# Patient Record
Sex: FEMALE | Race: OTHER
[De-identification: names, ages, dates, MRNs, and addresses within clinical notes are randomized per-mention and may not be internally consistent; named-entity substitution may affect disease eponyms.]

---

## 2019-12-29 ENCOUNTER — FORM ENCOUNTER (OUTPATIENT)
Age: 69
End: 2019-12-29

## 2020-01-10 ENCOUNTER — OUTPATIENT (OUTPATIENT)
Dept: OUTPATIENT SERVICES | Facility: HOSPITAL | Age: 70
LOS: 1 days | Discharge: HOME | End: 2020-01-10
Payer: MEDICARE

## 2020-01-10 DIAGNOSIS — R07.9 CHEST PAIN, UNSPECIFIED: ICD-10-CM

## 2020-01-10 PROCEDURE — 78452 HT MUSCLE IMAGE SPECT MULT: CPT | Mod: 26

## 2020-01-15 ENCOUNTER — FORM ENCOUNTER (OUTPATIENT)
Age: 70
End: 2020-01-15

## 2020-03-18 ENCOUNTER — FORM ENCOUNTER (OUTPATIENT)
Age: 70
End: 2020-03-18

## 2020-04-19 ENCOUNTER — FORM ENCOUNTER (OUTPATIENT)
Age: 70
End: 2020-04-19

## 2021-10-11 ENCOUNTER — FORM ENCOUNTER (OUTPATIENT)
Age: 71
End: 2021-10-11

## 2022-06-02 ENCOUNTER — FORM ENCOUNTER (OUTPATIENT)
Age: 72
End: 2022-06-02

## 2022-06-03 VITALS
SYSTOLIC BLOOD PRESSURE: 144 MMHG | HEART RATE: 90 BPM | OXYGEN SATURATION: 98 % | WEIGHT: 100 LBS | RESPIRATION RATE: 15 BRPM | HEIGHT: 58 IN | DIASTOLIC BLOOD PRESSURE: 80 MMHG | BODY MASS INDEX: 20.99 KG/M2

## 2022-08-30 ENCOUNTER — NON-APPOINTMENT (OUTPATIENT)
Age: 72
End: 2022-08-30

## 2022-08-30 DIAGNOSIS — R94.31 ABNORMAL ELECTROCARDIOGRAM [ECG] [EKG]: ICD-10-CM

## 2022-08-30 DIAGNOSIS — Z78.9 OTHER SPECIFIED HEALTH STATUS: ICD-10-CM

## 2022-08-30 PROBLEM — Z00.00 ENCOUNTER FOR PREVENTIVE HEALTH EXAMINATION: Status: ACTIVE | Noted: 2022-08-30

## 2022-12-23 ENCOUNTER — APPOINTMENT (OUTPATIENT)
Dept: CARDIOLOGY | Facility: CLINIC | Age: 72
End: 2022-12-23
Payer: MEDICARE

## 2022-12-23 VITALS
SYSTOLIC BLOOD PRESSURE: 145 MMHG | HEIGHT: 58 IN | BODY MASS INDEX: 20.99 KG/M2 | DIASTOLIC BLOOD PRESSURE: 86 MMHG | OXYGEN SATURATION: 99 % | HEART RATE: 100 BPM | WEIGHT: 100 LBS

## 2022-12-23 PROCEDURE — 99213 OFFICE O/P EST LOW 20 MIN: CPT | Mod: 25

## 2022-12-23 PROCEDURE — 93000 ELECTROCARDIOGRAM COMPLETE: CPT

## 2022-12-23 PROCEDURE — 93306 TTE W/DOPPLER COMPLETE: CPT

## 2022-12-27 NOTE — HISTORY OF PRESENT ILLNESS
[FreeTextEntry1] : FÁTIMA WALTER is a 72-year-old female, with a PMHx significant for nonischemic cardiomyopathy (LVEF of 35%, NYHA Class I), who presents today for a follow up visit. Endorses SOB with exertion, which is relieved with rest. Stable in nature. Otherwise: (-) fever, (-) chills, (-) chest pain, (-) cough, (-) hemoptysis, (-) recent URI, (-) abdominal pain, (-) nausea, (-) vomiting, (-) melena, (-) hematochezia, (-) leg swelling/pain, (-) recent travel, (-) prolonged immobility.

## 2022-12-27 NOTE — DISCUSSION/SUMMARY
[EKG obtained to assist in diagnosis and management of assessed problem(s)] : EKG obtained to assist in diagnosis and management of assessed problem(s) [FreeTextEntry1] : EKG performed today was unremarkable.\par \par Nonischemic Cardiomyopathy: NYHA Class I. Recommend an echocardiogram to reevaluate LV function and EF. Echocardiogram performed today revealed reduced EF of 35%. \par Increase Lisinopril to 20 mg QD for elevated BP. \par \par Instructed to follow up in 6 months for routine care. \par Plan was discussed with the patient.\par

## 2023-02-10 ENCOUNTER — INPATIENT (INPATIENT)
Facility: HOSPITAL | Age: 73
LOS: 3 days | Discharge: HOME | DRG: 872 | End: 2023-02-14
Attending: STUDENT IN AN ORGANIZED HEALTH CARE EDUCATION/TRAINING PROGRAM | Admitting: STUDENT IN AN ORGANIZED HEALTH CARE EDUCATION/TRAINING PROGRAM
Payer: MEDICARE

## 2023-02-10 VITALS
RESPIRATION RATE: 20 BRPM | TEMPERATURE: 100 F | SYSTOLIC BLOOD PRESSURE: 139 MMHG | OXYGEN SATURATION: 97 % | WEIGHT: 102.07 LBS | HEART RATE: 127 BPM | DIASTOLIC BLOOD PRESSURE: 77 MMHG

## 2023-02-10 LAB
APTT BLD: 32.9 SEC — SIGNIFICANT CHANGE UP (ref 27–39.2)
BASOPHILS # BLD AUTO: 0.08 K/UL — SIGNIFICANT CHANGE UP (ref 0–0.2)
BASOPHILS NFR BLD AUTO: 0.5 % — SIGNIFICANT CHANGE UP (ref 0–1)
EOSINOPHIL # BLD AUTO: 0.07 K/UL — SIGNIFICANT CHANGE UP (ref 0–0.7)
EOSINOPHIL NFR BLD AUTO: 0.5 % — SIGNIFICANT CHANGE UP (ref 0–8)
HCT VFR BLD CALC: 40.7 % — SIGNIFICANT CHANGE UP (ref 37–47)
HGB BLD-MCNC: 13.5 G/DL — SIGNIFICANT CHANGE UP (ref 12–16)
IMM GRANULOCYTES NFR BLD AUTO: 0.6 % — HIGH (ref 0.1–0.3)
INR BLD: 1.3 RATIO — SIGNIFICANT CHANGE UP (ref 0.65–1.3)
LYMPHOCYTES # BLD AUTO: 1.4 K/UL — SIGNIFICANT CHANGE UP (ref 1.2–3.4)
LYMPHOCYTES # BLD AUTO: 9.2 % — LOW (ref 20.5–51.1)
MCHC RBC-ENTMCNC: 25.7 PG — LOW (ref 27–31)
MCHC RBC-ENTMCNC: 33.2 G/DL — SIGNIFICANT CHANGE UP (ref 32–37)
MCV RBC AUTO: 77.5 FL — LOW (ref 81–99)
MONOCYTES # BLD AUTO: 0.98 K/UL — HIGH (ref 0.1–0.6)
MONOCYTES NFR BLD AUTO: 6.5 % — SIGNIFICANT CHANGE UP (ref 1.7–9.3)
NEUTROPHILS # BLD AUTO: 12.55 K/UL — HIGH (ref 1.4–6.5)
NEUTROPHILS NFR BLD AUTO: 82.7 % — HIGH (ref 42.2–75.2)
NRBC # BLD: 0 /100 WBCS — SIGNIFICANT CHANGE UP (ref 0–0)
PLATELET # BLD AUTO: 246 K/UL — SIGNIFICANT CHANGE UP (ref 130–400)
PROTHROM AB SERPL-ACNC: 14.9 SEC — HIGH (ref 9.95–12.87)
RBC # BLD: 5.25 M/UL — SIGNIFICANT CHANGE UP (ref 4.2–5.4)
RBC # FLD: 13.9 % — SIGNIFICANT CHANGE UP (ref 11.5–14.5)
WBC # BLD: 15.17 K/UL — HIGH (ref 4.8–10.8)
WBC # FLD AUTO: 15.17 K/UL — HIGH (ref 4.8–10.8)

## 2023-02-10 PROCEDURE — 93010 ELECTROCARDIOGRAM REPORT: CPT

## 2023-02-10 RX ORDER — SODIUM CHLORIDE 9 MG/ML
1000 INJECTION INTRAMUSCULAR; INTRAVENOUS; SUBCUTANEOUS ONCE
Refills: 0 | Status: DISCONTINUED | OUTPATIENT
Start: 2023-02-10 | End: 2023-02-10

## 2023-02-10 RX ORDER — ASPIRIN/CALCIUM CARB/MAGNESIUM 324 MG
324 TABLET ORAL ONCE
Refills: 0 | Status: COMPLETED | OUTPATIENT
Start: 2023-02-10 | End: 2023-02-10

## 2023-02-10 RX ORDER — VANCOMYCIN HCL 1 G
500 VIAL (EA) INTRAVENOUS ONCE
Refills: 0 | Status: COMPLETED | OUTPATIENT
Start: 2023-02-10 | End: 2023-02-10

## 2023-02-10 RX ORDER — SODIUM CHLORIDE 9 MG/ML
1450 INJECTION, SOLUTION INTRAVENOUS ONCE
Refills: 0 | Status: COMPLETED | OUTPATIENT
Start: 2023-02-10 | End: 2023-02-10

## 2023-02-10 RX ORDER — CEFEPIME 1 G/1
2000 INJECTION, POWDER, FOR SOLUTION INTRAMUSCULAR; INTRAVENOUS ONCE
Refills: 0 | Status: COMPLETED | OUTPATIENT
Start: 2023-02-10 | End: 2023-02-10

## 2023-02-10 RX ORDER — ACETAMINOPHEN 500 MG
975 TABLET ORAL ONCE
Refills: 0 | Status: COMPLETED | OUTPATIENT
Start: 2023-02-10 | End: 2023-02-10

## 2023-02-10 RX ADMIN — SODIUM CHLORIDE 1450 MILLILITER(S): 9 INJECTION, SOLUTION INTRAVENOUS at 23:40

## 2023-02-10 RX ADMIN — CEFEPIME 100 MILLIGRAM(S): 1 INJECTION, POWDER, FOR SOLUTION INTRAMUSCULAR; INTRAVENOUS at 23:50

## 2023-02-10 RX ADMIN — Medication 324 MILLIGRAM(S): at 23:54

## 2023-02-10 RX ADMIN — Medication 975 MILLIGRAM(S): at 23:54

## 2023-02-10 NOTE — ED PROVIDER NOTE - PHYSICAL EXAMINATION
CONSTITUTIONAL: well-developed, well-nourished, in no acute distress  SKIN: warm, dry  HEAD: Normocephalic  EYES: no conjunctival erythema  ENT: no nasal discharge, airway clear  NECK: full ROM  CARD: regular rate and rhythm  RESP: normal respiratory effort, no wheezes, rales or rhonchi orthopneic   ABD: soft, non-distended, non-tender  EXT: moving all extremities spontaneously  NEURO: alert and oriented, grossly unremarkable  PSYCH: cooperative, appropriate

## 2023-02-10 NOTE — ED PROVIDER NOTE - DIFFERENTIAL DIAGNOSIS
cardiac ischemia, cardiac arrhythmia, acute coronary syndromes, symptomatic anemia, electrolyte abnormalities including hyponatremia and hypokalemia, and pneumothorax. Differential Diagnosis

## 2023-02-10 NOTE — ED PROVIDER NOTE - CLINICAL SUMMARY MEDICAL DECISION MAKING FREE TEXT BOX
Patient was signed out to me by Dr. Myers pending admission for chest pressure and abnormal EKG. Patient remained hemodynamically stable during the course of ED stay. Discussed with patient about the results of the diagnostic studies. Discussed with admitting physician and MAR, patient is admitted to Medicine for further evaluation and care.

## 2023-02-10 NOTE — ED PROVIDER NOTE - OBJECTIVE STATEMENT
72-year-old female with past medical history of hypertension who present with few days of shortness of breath and chest pain.  Code STEMI was called but canceled later upon cardiology fellow evaluation.  Of note patient has been having cough for the past couple weeks and started having this shortness of breath chest pressure for the past few days.  She went to urgent care and was prescribed albuterol which she uses 1 puff.  Non-smoker.  Denies any cardiac history.  No nausea or vomiting abdominal pain urinary symptom or diarrhea.

## 2023-02-10 NOTE — ED PROVIDER NOTE - ATTENDING CONTRIBUTION TO CARE
72-year-old female to ED with chest pain and cough.  Patient with 2 weeks of symptoms no injury or fall patient here with family as today worsening pressure in her chest palpitations and not feeling well.  On arrival patient febrile  changes on EKG with depressions so STEMI code activated and cardiology at bedside.  AVSS, exam as noted, CTAB, tachycardia abdomen soft NTND, (+) bowel sounds, neuro nonfocal

## 2023-02-10 NOTE — ED PROVIDER NOTE - PROGRESS NOTE DETAILS
s/o to f/u xray and dispo rk: discussed case with cardio fellow Dr Fuchs, patient to go to [facility].

## 2023-02-10 NOTE — ED PROVIDER NOTE - CARE PLAN
Principal Discharge DX:	Acute sepsis  Secondary Diagnosis:	Community acquired pneumonia  Secondary Diagnosis:	Pulmonary congestion   1

## 2023-02-10 NOTE — ED PROVIDER NOTE - NS ED ROS FT
Constitutional: No fever   Eyes:  No visual changes  Ears:  No hearing changes  Neck: No neck pain  Cardiac:  +chest pain  Respiratory:  + SOB   GI:  No abdominal pain, nausea, or vomiting  :  No dysuria  MS:  No back pain  Neuro:  No headache or weakness.  No LOC  Skin:  No skin rash

## 2023-02-10 NOTE — CHART NOTE - NSCHARTNOTEFT_GEN_A_CORE
Code STEMI was called in ED. I immediately responded and went to the ED. I examined and assessed the patient. EKG reviewed and case discussed with interventional cardiologist on call. No ST elevations on EKG. Code STEMI cancelled. Code STEMI was called in ED. I immediately responded and went to the ED. I examined and assessed the patient. EKG reviewed and case discussed with interventional cardiologist on call- Dr. Coronado. No ST elevations on EKG. Code STEMI cancelled.

## 2023-02-11 DIAGNOSIS — A41.9 SEPSIS, UNSPECIFIED ORGANISM: ICD-10-CM

## 2023-02-11 LAB
ALBUMIN SERPL ELPH-MCNC: 3.4 G/DL — LOW (ref 3.5–5.2)
ALBUMIN SERPL ELPH-MCNC: 3.9 G/DL — SIGNIFICANT CHANGE UP (ref 3.5–5.2)
ALP SERPL-CCNC: 118 U/L — HIGH (ref 30–115)
ALP SERPL-CCNC: 98 U/L — SIGNIFICANT CHANGE UP (ref 30–115)
ALT FLD-CCNC: 11 U/L — SIGNIFICANT CHANGE UP (ref 0–41)
ALT FLD-CCNC: 15 U/L — SIGNIFICANT CHANGE UP (ref 0–41)
ANION GAP SERPL CALC-SCNC: 11 MMOL/L — SIGNIFICANT CHANGE UP (ref 7–14)
ANION GAP SERPL CALC-SCNC: 11 MMOL/L — SIGNIFICANT CHANGE UP (ref 7–14)
APPEARANCE UR: CLEAR — SIGNIFICANT CHANGE UP
AST SERPL-CCNC: 10 U/L — SIGNIFICANT CHANGE UP (ref 0–41)
AST SERPL-CCNC: 13 U/L — SIGNIFICANT CHANGE UP (ref 0–41)
BACTERIA # UR AUTO: NEGATIVE — SIGNIFICANT CHANGE UP
BASOPHILS # BLD AUTO: 0.05 K/UL — SIGNIFICANT CHANGE UP (ref 0–0.2)
BASOPHILS NFR BLD AUTO: 0.5 % — SIGNIFICANT CHANGE UP (ref 0–1)
BILIRUB SERPL-MCNC: 0.4 MG/DL — SIGNIFICANT CHANGE UP (ref 0.2–1.2)
BILIRUB SERPL-MCNC: 0.7 MG/DL — SIGNIFICANT CHANGE UP (ref 0.2–1.2)
BILIRUB UR-MCNC: NEGATIVE — SIGNIFICANT CHANGE UP
BUN SERPL-MCNC: 15 MG/DL — SIGNIFICANT CHANGE UP (ref 10–20)
BUN SERPL-MCNC: 20 MG/DL — SIGNIFICANT CHANGE UP (ref 10–20)
CALCIUM SERPL-MCNC: 8.5 MG/DL — SIGNIFICANT CHANGE UP (ref 8.4–10.5)
CALCIUM SERPL-MCNC: 8.9 MG/DL — SIGNIFICANT CHANGE UP (ref 8.4–10.5)
CHLORIDE SERPL-SCNC: 102 MMOL/L — SIGNIFICANT CHANGE UP (ref 98–110)
CHLORIDE SERPL-SCNC: 99 MMOL/L — SIGNIFICANT CHANGE UP (ref 98–110)
CO2 SERPL-SCNC: 26 MMOL/L — SIGNIFICANT CHANGE UP (ref 17–32)
CO2 SERPL-SCNC: 26 MMOL/L — SIGNIFICANT CHANGE UP (ref 17–32)
COLOR SPEC: COLORLESS — SIGNIFICANT CHANGE UP
CREAT SERPL-MCNC: 0.9 MG/DL — SIGNIFICANT CHANGE UP (ref 0.7–1.5)
CREAT SERPL-MCNC: 1 MG/DL — SIGNIFICANT CHANGE UP (ref 0.7–1.5)
CRP SERPL-MCNC: 158.2 MG/L — HIGH
D DIMER BLD IA.RAPID-MCNC: 194 NG/ML DDU — SIGNIFICANT CHANGE UP
DIFF PNL FLD: NEGATIVE — SIGNIFICANT CHANGE UP
EGFR: 61 ML/MIN/1.73M2 — SIGNIFICANT CHANGE UP
EGFR: 69 ML/MIN/1.73M2 — SIGNIFICANT CHANGE UP
EOSINOPHIL # BLD AUTO: 0.18 K/UL — SIGNIFICANT CHANGE UP (ref 0–0.7)
EOSINOPHIL NFR BLD AUTO: 1.7 % — SIGNIFICANT CHANGE UP (ref 0–8)
EPI CELLS # UR: 0 /HPF — SIGNIFICANT CHANGE UP (ref 0–5)
FERRITIN SERPL-MCNC: 87 NG/ML — SIGNIFICANT CHANGE UP (ref 15–150)
GLUCOSE SERPL-MCNC: 109 MG/DL — HIGH (ref 70–99)
GLUCOSE SERPL-MCNC: 121 MG/DL — HIGH (ref 70–99)
GLUCOSE UR QL: NEGATIVE — SIGNIFICANT CHANGE UP
HCT VFR BLD CALC: 37.7 % — SIGNIFICANT CHANGE UP (ref 37–47)
HGB BLD-MCNC: 12.2 G/DL — SIGNIFICANT CHANGE UP (ref 12–16)
HYALINE CASTS # UR AUTO: 0 /LPF — SIGNIFICANT CHANGE UP (ref 0–7)
IMM GRANULOCYTES NFR BLD AUTO: 0.5 % — HIGH (ref 0.1–0.3)
IRON SATN MFR SERPL: 10 % — LOW (ref 15–50)
IRON SATN MFR SERPL: 27 UG/DL — LOW (ref 35–150)
KETONES UR-MCNC: NEGATIVE — SIGNIFICANT CHANGE UP
LDH SERPL L TO P-CCNC: 169 — SIGNIFICANT CHANGE UP (ref 50–242)
LEUKOCYTE ESTERASE UR-ACNC: ABNORMAL
LYMPHOCYTES # BLD AUTO: 1.16 K/UL — LOW (ref 1.2–3.4)
LYMPHOCYTES # BLD AUTO: 11.1 % — LOW (ref 20.5–51.1)
MAGNESIUM SERPL-MCNC: 2.2 MG/DL — SIGNIFICANT CHANGE UP (ref 1.8–2.4)
MCHC RBC-ENTMCNC: 25.1 PG — LOW (ref 27–31)
MCHC RBC-ENTMCNC: 32.4 G/DL — SIGNIFICANT CHANGE UP (ref 32–37)
MCV RBC AUTO: 77.6 FL — LOW (ref 81–99)
MONOCYTES # BLD AUTO: 0.52 K/UL — SIGNIFICANT CHANGE UP (ref 0.1–0.6)
MONOCYTES NFR BLD AUTO: 5 % — SIGNIFICANT CHANGE UP (ref 1.7–9.3)
NEUTROPHILS # BLD AUTO: 8.53 K/UL — HIGH (ref 1.4–6.5)
NEUTROPHILS NFR BLD AUTO: 81.2 % — HIGH (ref 42.2–75.2)
NITRITE UR-MCNC: NEGATIVE — SIGNIFICANT CHANGE UP
NRBC # BLD: 0 /100 WBCS — SIGNIFICANT CHANGE UP (ref 0–0)
NT-PROBNP SERPL-SCNC: 7194 PG/ML — HIGH (ref 0–300)
PH UR: 7 — SIGNIFICANT CHANGE UP (ref 5–8)
PHOSPHATE SERPL-MCNC: 3.2 MG/DL — SIGNIFICANT CHANGE UP (ref 2.1–4.9)
PLATELET # BLD AUTO: 217 K/UL — SIGNIFICANT CHANGE UP (ref 130–400)
POTASSIUM SERPL-MCNC: 3.6 MMOL/L — SIGNIFICANT CHANGE UP (ref 3.5–5)
POTASSIUM SERPL-MCNC: 3.7 MMOL/L — SIGNIFICANT CHANGE UP (ref 3.5–5)
POTASSIUM SERPL-SCNC: 3.6 MMOL/L — SIGNIFICANT CHANGE UP (ref 3.5–5)
POTASSIUM SERPL-SCNC: 3.7 MMOL/L — SIGNIFICANT CHANGE UP (ref 3.5–5)
PROCALCITONIN SERPL-MCNC: 0.14 NG/ML — HIGH (ref 0.02–0.1)
PROT SERPL-MCNC: 5.4 G/DL — LOW (ref 6–8)
PROT SERPL-MCNC: 6.2 G/DL — SIGNIFICANT CHANGE UP (ref 6–8)
PROT UR-MCNC: NEGATIVE — SIGNIFICANT CHANGE UP
RAPID RVP RESULT: DETECTED
RBC # BLD: 4.86 M/UL — SIGNIFICANT CHANGE UP (ref 4.2–5.4)
RBC # FLD: 14.2 % — SIGNIFICANT CHANGE UP (ref 11.5–14.5)
RBC CASTS # UR COMP ASSIST: 0 /HPF — SIGNIFICANT CHANGE UP (ref 0–4)
RV+EV RNA SPEC QL NAA+PROBE: DETECTED
SARS-COV-2 RNA SPEC QL NAA+PROBE: SIGNIFICANT CHANGE UP
SODIUM SERPL-SCNC: 136 MMOL/L — SIGNIFICANT CHANGE UP (ref 135–146)
SODIUM SERPL-SCNC: 139 MMOL/L — SIGNIFICANT CHANGE UP (ref 135–146)
SP GR SPEC: 1 — LOW (ref 1.01–1.03)
TIBC SERPL-MCNC: 267 UG/DL — SIGNIFICANT CHANGE UP (ref 220–430)
TROPONIN T SERPL-MCNC: <0.01 NG/ML — SIGNIFICANT CHANGE UP
TROPONIN T SERPL-MCNC: <0.01 NG/ML — SIGNIFICANT CHANGE UP
UIBC SERPL-MCNC: 240 UG/DL — SIGNIFICANT CHANGE UP (ref 110–370)
UROBILINOGEN FLD QL: SIGNIFICANT CHANGE UP
WBC # BLD: 10.49 K/UL — SIGNIFICANT CHANGE UP (ref 4.8–10.8)
WBC # FLD AUTO: 10.49 K/UL — SIGNIFICANT CHANGE UP (ref 4.8–10.8)
WBC UR QL: 1 /HPF — SIGNIFICANT CHANGE UP (ref 0–5)

## 2023-02-11 PROCEDURE — 83615 LACTATE (LD) (LDH) ENZYME: CPT

## 2023-02-11 PROCEDURE — 82607 VITAMIN B-12: CPT

## 2023-02-11 PROCEDURE — 82746 ASSAY OF FOLIC ACID SERUM: CPT

## 2023-02-11 PROCEDURE — 84145 PROCALCITONIN (PCT): CPT

## 2023-02-11 PROCEDURE — 84100 ASSAY OF PHOSPHORUS: CPT

## 2023-02-11 PROCEDURE — 71045 X-RAY EXAM CHEST 1 VIEW: CPT | Mod: 26

## 2023-02-11 PROCEDURE — 71045 X-RAY EXAM CHEST 1 VIEW: CPT

## 2023-02-11 PROCEDURE — 83540 ASSAY OF IRON: CPT

## 2023-02-11 PROCEDURE — 83550 IRON BINDING TEST: CPT

## 2023-02-11 PROCEDURE — 85025 COMPLETE CBC W/AUTO DIFF WBC: CPT

## 2023-02-11 PROCEDURE — 83735 ASSAY OF MAGNESIUM: CPT

## 2023-02-11 PROCEDURE — 97161 PT EVAL LOW COMPLEX 20 MIN: CPT | Mod: GP

## 2023-02-11 PROCEDURE — 94640 AIRWAY INHALATION TREATMENT: CPT

## 2023-02-11 PROCEDURE — 87040 BLOOD CULTURE FOR BACTERIA: CPT

## 2023-02-11 PROCEDURE — 84443 ASSAY THYROID STIM HORMONE: CPT

## 2023-02-11 PROCEDURE — 86803 HEPATITIS C AB TEST: CPT

## 2023-02-11 PROCEDURE — 36415 COLL VENOUS BLD VENIPUNCTURE: CPT

## 2023-02-11 PROCEDURE — 84484 ASSAY OF TROPONIN QUANT: CPT

## 2023-02-11 PROCEDURE — 99222 1ST HOSP IP/OBS MODERATE 55: CPT

## 2023-02-11 PROCEDURE — 85027 COMPLETE CBC AUTOMATED: CPT

## 2023-02-11 PROCEDURE — 93306 TTE W/DOPPLER COMPLETE: CPT

## 2023-02-11 PROCEDURE — 82728 ASSAY OF FERRITIN: CPT

## 2023-02-11 PROCEDURE — 99223 1ST HOSP IP/OBS HIGH 75: CPT

## 2023-02-11 PROCEDURE — 85379 FIBRIN DEGRADATION QUANT: CPT

## 2023-02-11 PROCEDURE — 80053 COMPREHEN METABOLIC PANEL: CPT

## 2023-02-11 PROCEDURE — 86140 C-REACTIVE PROTEIN: CPT

## 2023-02-11 RX ORDER — LISINOPRIL 2.5 MG/1
10 TABLET ORAL DAILY
Refills: 0 | Status: DISCONTINUED | OUTPATIENT
Start: 2023-02-11 | End: 2023-02-14

## 2023-02-11 RX ORDER — CEFTRIAXONE 500 MG/1
INJECTION, POWDER, FOR SOLUTION INTRAMUSCULAR; INTRAVENOUS
Refills: 0 | Status: DISCONTINUED | OUTPATIENT
Start: 2023-02-11 | End: 2023-02-11

## 2023-02-11 RX ORDER — ENOXAPARIN SODIUM 100 MG/ML
40 INJECTION SUBCUTANEOUS EVERY 24 HOURS
Refills: 0 | Status: DISCONTINUED | OUTPATIENT
Start: 2023-02-11 | End: 2023-02-14

## 2023-02-11 RX ORDER — ACETAMINOPHEN 500 MG
650 TABLET ORAL EVERY 6 HOURS
Refills: 0 | Status: DISCONTINUED | OUTPATIENT
Start: 2023-02-11 | End: 2023-02-14

## 2023-02-11 RX ORDER — AZITHROMYCIN 500 MG/1
TABLET, FILM COATED ORAL
Refills: 0 | Status: DISCONTINUED | OUTPATIENT
Start: 2023-02-11 | End: 2023-02-11

## 2023-02-11 RX ORDER — CEFTRIAXONE 500 MG/1
1000 INJECTION, POWDER, FOR SOLUTION INTRAMUSCULAR; INTRAVENOUS ONCE
Refills: 0 | Status: COMPLETED | OUTPATIENT
Start: 2023-02-11 | End: 2023-02-11

## 2023-02-11 RX ORDER — IPRATROPIUM/ALBUTEROL SULFATE 18-103MCG
3 AEROSOL WITH ADAPTER (GRAM) INHALATION EVERY 6 HOURS
Refills: 0 | Status: DISCONTINUED | OUTPATIENT
Start: 2023-02-11 | End: 2023-02-14

## 2023-02-11 RX ORDER — SODIUM CHLORIDE 9 MG/ML
1000 INJECTION, SOLUTION INTRAVENOUS
Refills: 0 | Status: DISCONTINUED | OUTPATIENT
Start: 2023-02-11 | End: 2023-02-12

## 2023-02-11 RX ORDER — AZITHROMYCIN 500 MG/1
500 TABLET, FILM COATED ORAL ONCE
Refills: 0 | Status: COMPLETED | OUTPATIENT
Start: 2023-02-11 | End: 2023-02-11

## 2023-02-11 RX ORDER — ASPIRIN/CALCIUM CARB/MAGNESIUM 324 MG
81 TABLET ORAL DAILY
Refills: 0 | Status: DISCONTINUED | OUTPATIENT
Start: 2023-02-11 | End: 2023-02-14

## 2023-02-11 RX ORDER — ALBUTEROL 90 UG/1
2 AEROSOL, METERED ORAL EVERY 6 HOURS
Refills: 0 | Status: DISCONTINUED | OUTPATIENT
Start: 2023-02-11 | End: 2023-02-14

## 2023-02-11 RX ORDER — FUROSEMIDE 40 MG
40 TABLET ORAL ONCE
Refills: 0 | Status: DISCONTINUED | OUTPATIENT
Start: 2023-02-11 | End: 2023-02-11

## 2023-02-11 RX ORDER — GUAIFENESIN/DEXTROMETHORPHAN 600MG-30MG
10 TABLET, EXTENDED RELEASE 12 HR ORAL EVERY 4 HOURS
Refills: 0 | Status: DISCONTINUED | OUTPATIENT
Start: 2023-02-11 | End: 2023-02-14

## 2023-02-11 RX ADMIN — Medication 81 MILLIGRAM(S): at 11:45

## 2023-02-11 RX ADMIN — ENOXAPARIN SODIUM 40 MILLIGRAM(S): 100 INJECTION SUBCUTANEOUS at 14:34

## 2023-02-11 RX ADMIN — Medication 650 MILLIGRAM(S): at 11:01

## 2023-02-11 RX ADMIN — Medication 3 MILLILITER(S): at 14:34

## 2023-02-11 RX ADMIN — SODIUM CHLORIDE 75 MILLILITER(S): 9 INJECTION, SOLUTION INTRAVENOUS at 11:00

## 2023-02-11 RX ADMIN — Medication 3 MILLILITER(S): at 21:36

## 2023-02-11 RX ADMIN — Medication 975 MILLIGRAM(S): at 00:36

## 2023-02-11 RX ADMIN — SODIUM CHLORIDE 1450 MILLILITER(S): 9 INJECTION, SOLUTION INTRAVENOUS at 00:36

## 2023-02-11 RX ADMIN — AZITHROMYCIN 255 MILLIGRAM(S): 500 TABLET, FILM COATED ORAL at 04:00

## 2023-02-11 RX ADMIN — CEFTRIAXONE 100 MILLIGRAM(S): 500 INJECTION, POWDER, FOR SOLUTION INTRAMUSCULAR; INTRAVENOUS at 06:46

## 2023-02-11 RX ADMIN — CEFEPIME 2000 MILLIGRAM(S): 1 INJECTION, POWDER, FOR SOLUTION INTRAMUSCULAR; INTRAVENOUS at 00:25

## 2023-02-11 RX ADMIN — LISINOPRIL 10 MILLIGRAM(S): 2.5 TABLET ORAL at 06:46

## 2023-02-11 RX ADMIN — Medication 650 MILLIGRAM(S): at 10:23

## 2023-02-11 RX ADMIN — Medication 3 MILLILITER(S): at 08:00

## 2023-02-11 RX ADMIN — Medication 250 MILLIGRAM(S): at 00:36

## 2023-02-11 NOTE — H&P ADULT - NSHPPHYSICALEXAM_GEN_ALL_CORE
Vital Signs Last 24 Hrs  T(C): 37.7 (11 Feb 2023 00:25), Max: 38.9 (10 Feb 2023 23:28)  T(F): 99.8 (11 Feb 2023 00:25), Max: 102 (10 Feb 2023 23:28)  HR: 105 (11 Feb 2023 00:25) (105 - 127)  BP: 152/90 (11 Feb 2023 00:25) (139/77 - 172/83)  RR: 18 (11 Feb 2023 00:25) (18 - 20)  SpO2: 95% (11 Feb 2023 00:25) (95% - 97%)    Parameters below as of 11 Feb 2023 00:25  Patient On (Oxygen Delivery Method): room air    PHYSICAL EXAM  GENERAL: NAD  HEAD:  NCAT, EOMI, PERRL, MMM  NECK: Supple, Nontender  NERVOUS SYSTEM: AAOx3, NFD  CHEST/LUNG: + bs b/l w ronchi  HEART: +s1s2 RRR, tachycardia  ABDOMEN: soft, NT/ND  EXTREMITIES: pp, no edema

## 2023-02-11 NOTE — CONSULT NOTE ADULT - ASSESSMENT
SOB, chest tightness  HTN    -Code STEMI cancelled  -EKG showing no ST elevations  -trop (-) x1. Repeat and trend  -TTE  -keep K>4 and Mg>2  -serial EKG  -recall cardiology if recurrent chest pain  -RVP SOB, chest tightness  HTN    -admit to med tele  -Code STEMI cancelled  -EKG showing no ST elevations  -trop (-) x1. Repeat and trend  -TTE  -keep K>4 and Mg>2  -serial EKG  -recall cardiology if recurrent chest pain  -RVP

## 2023-02-11 NOTE — H&P ADULT - ASSESSMENT
72 F w h/o HTN presents for generalized symptoms of sob, chest pain and fatigue. In ED, there was some concern for ST changes on EKG so code STEMI was call but later cancelled after evaluated by cardiology. Later on patient spiked a fever to 102 and RVP came back positive for Enterovirus.    #Sepsis in setting of Enterovirus w possible superimposed bacterial pna  - patient febrile, tachycardic and tachypneic in ED w leukocytosis on labs  - s/p vanc, cefepime and azithro > continuing azithro and switching to rocephin  - cont nebs and inhaler, along w pain, fever, cough control prn  - f/u CXR read, pending labs and cultures  - can consider ID eval if no clinical improvement    #Elevated BNP in setting of viral infection - r/o viral pericarditis  - check echo & consider cardio eval depending on results  - cont tele monitoring in meantime    #HTN  - cont home lisinopril    DVT ppx: lovenox  GI ppx: ppi  Diet: DASH  Activity: IAT

## 2023-02-11 NOTE — ED PROCEDURE NOTE - PROCEDURE DATE TIME, MLM
Patient Education     Back Pain (Acute or Chronic)    Back pain is one of the most common problems. The good news is that most people feel better in 1 to 2 weeks, and most of the rest in 1 to 2 months. Most people can remain active.  People who have pain describe it differently--not everyone is the same.  · The pain can be sharp, stabbing, shooting, aching, cramping or burning.  · Movement, standing, bending, lifting, sitting, or walking may worsen pain.  · It can be localized to one spot or area, or it can be more generalized.  · It can spread or radiate upwards, to the front, or go down your arms or legs (sciatica).  · It can cause muscle spasm.  Most of the time, mechanical problems with the muscles or spine cause the pain. Mechanical problems are usually caused by an injury to the muscles or ligaments. While illness can cause back pain, it is usually not caused by a serious illness. Mechanical problems include:   · Physical activity such as sports, exercise, work, or normal activity  · Overexertion, lifting, pushing, pulling incorrectly or too aggressively  · Sudden twisting, bending, or stretching from an accident, or accidental movement  · Poor posture  · Stretching or moving wrong, without noticing pain at the time  · Poor coordination, lack of regular exercise (check with your doctor about this)  · Spinal disc disease or arthritis  · Stress  Pain can also be related to pregnancy, or illness like appendicitis, bladder or kidney infections, pelvic infections, and many other things.  Acute back pain usually gets better in 1 to 2 weeks. Back pain related to disk disease, arthritis in the spinal joints or spinal stenosis (narrowing of the spinal canal) can become chronic and last for months or years.  Unless you had a physical injury (for example, a car accident or fall) X-rays are usually not needed for the initial evaluation of back pain. If pain continues and does not respond to medical treatment, X-rays and  other tests may be needed.  Home care  Try these home care recommendations:  · When in bed, try to find a position of comfort. A firm mattress is best. Try lying flat on your back with pillows under your knees. You can also try lying on your side with your knees bent up towards your chest and a pillow between your knees.  · At first, do not try to stretch out the sore spots. If there is a strain, it is not like the good soreness you get after exercising without an injury. In this case, stretching may make it worse.  · Don't sit for long periods, as in a long car ride or during other travel. This puts more stress on the lower back than standing or walking.  · During the first 24 to 72 hours after an acute injury or flare up of chronic back pain, apply an ice pack to the painful area for 20 minutes and then remove it for 20 minutes. Do this over a period of 60 to 90 minutes or several times a day. This will reduce swelling and pain. Wrap the ice pack in a thin towel or plastic to protect your skin.  · You can start with ice, then switch to heat. Heat (hot shower, hot bath, or heating pad) reduces pain and works well for muscle spasms. Heat can be applied to the painful area for 20 minutes then remove it for 20 minutes. Do this over a period of 60 to 90 minutes or several times a day. Do not sleep on a heating pad. It can lead to skin burns or tissue damage.  · You can alternate ice and heat therapy. Talk with your doctor about the best treatment for your back pain.  · Therapeutic massage can help relax the back muscles without stretching them.  · Be aware of safe lifting methods and do not lift anything without stretching first.  Medicines  Talk to your doctor before using medicine, especially if you have other medical problems or are taking other medicines.  · You may use over-the-counter medicine as directed on the bottle to control pain, unless another pain medicine was prescribed. If you have chronic conditions  like diabetes, liver or kidney disease, stomach ulcers, or gastrointestinal bleeding, or are taking blood thinners, talk to your doctor before taking any medicine.  · Be careful if you are given a prescription medicines, narcotics, or medicine for muscle spasms. They can cause drowsiness, affect your coordination, reflexes, and judgement. Do not drive or operate heavy machinery.  Follow-up care  Follow up with your healthcare provider, or as advised.   A radiologist will review any X-rays that were taken. Your provide will notify you of any new findings that may affect your care.  Call 911  Call 911 if any of the following occur:  · Trouble breathing  · Confusion  · Very drowsy or trouble awakening  · Fainting or loss of consciousness  · Rapid or very slow heart rate  · Loss of bowel or bladder control  When to seek medical advice  Call your healthcare provider right away if any of these occur:   · Pain becomes worse or spreads to your legs  · Weakness or numbness in one or both legs  · Numbness in the groin or genital area  Date Last Reviewed: 7/1/2016  © 0896-5169 [employer]. [address] [place] [address], [place] [address]. All rights reserved. This information is not intended as a substitute for professional medical care. Always follow your healthcare professional's instructions.            11-Feb-2023 00:46

## 2023-02-11 NOTE — H&P ADULT - HISTORY OF PRESENT ILLNESS
72 F w h/o HTN presents for generalized symptoms of sob, chest pain and fatigue. In ED, there was some concern for ST changes on EKG so code STEMI was call but later cancelled after evaluated by cardiology. Patient with no complaints at time of my exam; ROS neg.     Initial Vitals showed, T 99.6, /77, , RR20, SpO2 97% on RA.   Labs significant for wbc 15.17 and BNP 7194  Later on it was reported that patient spiked a fever to 102 and RVP came back positive for Enterovirus.    Patient was admitted for sepsis workup.

## 2023-02-11 NOTE — CONSULT NOTE ADULT - ASSESSMENT
ASSESSMENT  72 F w h/o HTN presents for generalized symptoms of sob, chest pain and fatigue. In ED, there was some concern for ST changes on EKG so code STEMI was call but later cancelled after evaluated by cardiology. ID consulted for PNA       IMPRESSION  #  #+Entero/rhinovirus with Sepsis on admission T>101 P>90  < from: Xray Chest 1 View-PORTABLE IMMEDIATE (02.11.23 @ 00:17) >  No radiographic evidence of cardiopulmonary disease.    UA without significant pyuria   Troponin T, Serum: <0.01 ng/mL (02.11.23 @ 08:24) x2  Creatinine, Serum: 0.9 (02-11-23 @ 08:24)    Weight (kg): 46.3 (02-10-23 @ 23:12)    RECOMMENDATIONS  This is an incomplete consult note. All final recommendations to follow after interview and examination of the patient. Please follow recommendations noted below.  - Viral infection, no indication for antibiotics , D/C  - Supportive care   - f/u BCX    If any questions, please call or send a message on [employer] Teams  Please continue to update ID with any pertinent new laboratory or radiographic findings  #[id number]     ASSESSMENT  72 F w h/o HTN presents for generalized symptoms of sob, chest pain and fatigue. In ED, there was some concern for ST changes on EKG so code STEMI was call but later cancelled after evaluated by cardiology. ID consulted for PNA       IMPRESSION  #+Entero/rhinovirus with Sepsis on admission T>101 P>90  < from: Xray Chest 1 View-PORTABLE IMMEDIATE (02.11.23 @ 00:17) >  No radiographic evidence of cardiopulmonary disease.    UA without significant pyuria   Troponin T, Serum: <0.01 ng/mL (02.11.23 @ 08:24) x2  Creatinine, Serum: 0.9 (02-11-23 @ 08:24)    Weight (kg): 46.3 (02-10-23 @ 23:12)    RECOMMENDATIONS    - Viral infection, no indication for antibiotics , D/C  - Supportive care   - f/u BCX  - Recall ID PRN or if BCX results as +     If any questions, please call or send a message on [employer] Teams  Please continue to update ID with any pertinent new laboratory or radiographic findings  #[id number]

## 2023-02-11 NOTE — ED ADULT NURSE REASSESSMENT NOTE - NS ED NURSE REASSESS COMMENT FT1
given tylenol for c/o leg aching  no scale given
report called to [facility]
spoke with dr cheng regarding order for lasix and need for it  resident to confirm and advise
ate breakfast and lunch  awaiting bed  family at bedside  amb oob to br steady  denies complaints
pt awake and alert. denies pain/complaints  family at bedside  pending bed assignment  oob to br steady  continue to monitor

## 2023-02-11 NOTE — ED ADULT NURSE REASSESSMENT NOTE - CARDIO ASSESSMENT
--- There isn't one that is appropriate for a man with ranitidine off shelf now. He could look online for the OTC version temporarily. Otherwise until Pepcid is not on back order he could go to a PPI, whatever is covered at the lowest dose daily.

## 2023-02-11 NOTE — CONSULT NOTE ADULT - SUBJECTIVE AND OBJECTIVE BOX
FÁTIMA WALTER  72y, Female  Allergy: No Known Allergies      CHIEF COMPLAINT:   sepsis (2023 04:50)      LOS      HPI  HPI:  72 F w h/o HTN presents for generalized symptoms of sob, chest pain and fatigue. In ED, there was some concern for ST changes on EKG so code STEMI was call but later cancelled after evaluated by cardiology. Patient with no complaints at time of my exam; ROS neg.     Initial Vitals showed, T 99.6, /77, , RR20, SpO2 97% on RA.   Labs significant for wbc 15.17 and BNP 7194  Later on it was reported that patient spiked a fever to 102 and RVP came back positive for Enterovirus.    Patient was admitted for sepsis workup.  (2023 04:50)      INFECTIOUS DISEASE HISTORY:  ID consulted for PNA  Fever  +entero/rhinovirus     Currently ordered for:  azithromycin  IVPB      cefTRIAXone   IVPB          PMH  PAST MEDICAL & SURGICAL HISTORY:  Hypertension          FAMILY HISTORY  non-contributory     SOCIAL HISTORY  Social History:        ROS  ***    VITALS:  T(F): 97.8, Max: 102 (02-10-23 @ 23:28)  HR: 92  BP: 122/70  RR: 18Vital Signs Last 24 Hrs  T(C): 36.6 (2023 07:00), Max: 38.9 (10 Feb 2023 23:28)  T(F): 97.8 (2023 07:00), Max: 102 (10 Feb 2023 23:28)  HR: 92 (2023 07:00) (92 - 127)  BP: 122/70 (2023 07:00) (121/60 - 172/83)  BP(mean): --  RR: 18 (2023 07:00) (18 - 20)  SpO2: 98% (2023 07:00) (95% - 98%)    Parameters below as of 2023 08:00  Patient On (Oxygen Delivery Method): room air        PHYSICAL EXAM:  ***    TESTS & MEASUREMENTS:                        12.2   10.49 )-----------( 217      ( 2023 08:24 )             37.7         139  |  102  |  15  ----------------------------<  121<H>  3.6   |  26  |  0.9    Ca    8.5      2023 08:24  Phos  3.2     02-11  Mg     2.2     -    TPro  5.4<L>  /  Alb  3.4<L>  /  TBili  0.4  /  DBili  x   /  AST  10  /  ALT  11  /  AlkPhos  98  02-11      LIVER FUNCTIONS - ( 2023 08:24 )  Alb: 3.4 g/dL / Pro: 5.4 g/dL / ALK PHOS: 98 U/L / ALT: 11 U/L / AST: 10 U/L / GGT: x           Urinalysis Basic - ( 2023 00:43 )    Color: Colorless / Appearance: Clear / S.005 / pH: x  Gluc: x / Ketone: Negative  / Bili: Negative / Urobili: <2 mg/dL   Blood: x / Protein: Negative / Nitrite: Negative   Leuk Esterase: Small / RBC: 0 /HPF / WBC 1 /HPF   Sq Epi: x / Non Sq Epi: 0 /HPF / Bacteria: Negative              INFECTIOUS DISEASES TESTING  Rapid RVP Result: Detected (02-10-23 @ 23:41)      INFLAMMATORY MARKERS  C-Reactive Protein, Serum: 158.2 mg/L (23 @ 08:24)      RADIOLOGY & ADDITIONAL TESTS:  I have personally reviewed the last Chest xray  CXR      CT      CARDIOLOGY TESTING      MEDICATIONS  albuterol    90 MICROgram(s) HFA Inhaler 2 Inhalation every 6 hours  albuterol/ipratropium for Nebulization 3 Nebulizer every 6 hours  aspirin  chewable 81 Oral daily  azithromycin  IVPB     cefTRIAXone   IVPB     enoxaparin Injectable 40 SubCutaneous every 24 hours  lactated ringers. 1000 IV Continuous <Continuous>  lisinopril 10 Oral daily        ANTIBIOTICS:  azithromycin  IVPB      cefTRIAXone   IVPB          ALLERGIES:  No Known Allergies       FÁTIMA WALTER  72y, Female  Allergy: No Known Allergies      CHIEF COMPLAINT:   sepsis (2023 04:50)      LOS      HPI  HPI:  72 F w h/o HTN presents for generalized symptoms of sob, chest pain and fatigue. In ED, there was some concern for ST changes on EKG so code STEMI was call but later cancelled after evaluated by cardiology. Patient with no complaints at time of my exam; ROS neg.     Initial Vitals showed, T 99.6, /77, , RR20, SpO2 97% on RA.   Labs significant for wbc 15.17 and BNP 7194  Later on it was reported that patient spiked a fever to 102 and RVP came back positive for Enterovirus.    Patient was admitted for sepsis workup.  (2023 04:50)      INFECTIOUS DISEASE HISTORY:  ID consulted for PNA  Fever  +entero/rhinovirus     Currently ordered for:  azithromycin  IVPB      cefTRIAXone   IVPB          PMH  PAST MEDICAL & SURGICAL HISTORY:  Hypertension          FAMILY HISTORY  non-contributory     SOCIAL HISTORY  Social History:        ROS  General: Denies rigors, nightsweats  HEENT: as noted above  CV as noted above   PULM: Denies wheezing, hemoptysis  GI: Denies hematemesis, hematochezia, melena  : Denies discharge, hematuria  MSK: Denies arthralgias, myalgias  SKIN: Denies rash, lesions  NEURO: Denies paresthesias, weakness  PSYCH: Denies depression, anxiety     VITALS:  T(F): 97.8, Max: 102 (02-10-23 @ 23:28)  HR: 92  BP: 122/70  RR: 18Vital Signs Last 24 Hrs  T(C): 36.6 (2023 07:00), Max: 38.9 (10 Feb 2023 23:28)  T(F): 97.8 (2023 07:00), Max: 102 (10 Feb 2023 23:28)  HR: 92 (2023 07:00) (92 - 127)  BP: 122/70 (2023 07:00) (121/60 - 172/83)  BP(mean): --  RR: 18 (2023 07:00) (18 - 20)  SpO2: 98% (2023 07:00) (95% - 98%)    Parameters below as of 2023 08:00  Patient On (Oxygen Delivery Method): room air        PHYSICAL EXAM:  Gen: NAD, resting in bed  HEENT: Normocephalic, atraumatic  Neck: supple, no lymphadenopathy  CV: Regular rate & regular rhythm  Lungs: decreased BS at bases, no fremitus  Abdomen: Soft, BS present  Ext: Warm, well perfused  Neuro: non focal, awake  Skin: no rash, no erythema  Lines: no phlebitis     TESTS & MEASUREMENTS:                        12.2   10.49 )-----------( 217      ( 2023 08:24 )             37.7     -    139  |  102  |  15  ----------------------------<  121<H>  3.6   |  26  |  0.9    Ca    8.5      2023 08:24  Phos  3.2       Mg     2.2         TPro  5.4<L>  /  Alb  3.4<L>  /  TBili  0.4  /  DBili  x   /  AST  10  /  ALT  11  /  AlkPhos  98  02-11      LIVER FUNCTIONS - ( 2023 08:24 )  Alb: 3.4 g/dL / Pro: 5.4 g/dL / ALK PHOS: 98 U/L / ALT: 11 U/L / AST: 10 U/L / GGT: x           Urinalysis Basic - ( 2023 00:43 )    Color: Colorless / Appearance: Clear / S.005 / pH: x  Gluc: x / Ketone: Negative  / Bili: Negative / Urobili: <2 mg/dL   Blood: x / Protein: Negative / Nitrite: Negative   Leuk Esterase: Small / RBC: 0 /HPF / WBC 1 /HPF   Sq Epi: x / Non Sq Epi: 0 /HPF / Bacteria: Negative              INFECTIOUS DISEASES TESTING  Rapid RVP Result: Detected (02-10-23 @ 23:41)      INFLAMMATORY MARKERS  C-Reactive Protein, Serum: 158.2 mg/L (23 @ 08:24)      RADIOLOGY & ADDITIONAL TESTS:  I have personally reviewed the last Chest xray  CXR      CT      CARDIOLOGY TESTING      MEDICATIONS  albuterol    90 MICROgram(s) HFA Inhaler 2 Inhalation every 6 hours  albuterol/ipratropium for Nebulization 3 Nebulizer every 6 hours  aspirin  chewable 81 Oral daily  azithromycin  IVPB     cefTRIAXone   IVPB     enoxaparin Injectable 40 SubCutaneous every 24 hours  lactated ringers. 1000 IV Continuous <Continuous>  lisinopril 10 Oral daily        ANTIBIOTICS:  azithromycin  IVPB      cefTRIAXone   IVPB          ALLERGIES:  No Known Allergies

## 2023-02-11 NOTE — H&P ADULT - NSHPLABSRESULTS_GEN_ALL_CORE
Labs:                        13.5   15.17 )-----------( 246      ( 10 Feb 2023 23:41 )             40.7     136  |  99  |  20  ----------------------------<  109<H>  3.7   |  26  |  1.0    Ca    8.9      10 Feb 2023 23:41    TPro  6.2  /  Alb  3.9  /  TBili  0.7  /  DBili  x   /  AST  13  /  ALT  15  /  AlkPhos  118<H>  02-10    PT/INR - ( 10 Feb 2023 23:41 )   PT: 14.90 sec;   INR: 1.30 ratio    PTT - ( 10 Feb 2023 23:41 )  PTT:32.9 sec    Urinalysis Basic - ( 2023 00:43 )  Color: Colorless / Appearance: Clear / S.005 / pH: x  Gluc: x / Ketone: Negative  / Bili: Negative / Urobili: <2 mg/dL   Blood: x / Protein: Negative / Nitrite: Negative   Leuk Esterase: Small / RBC: 0 /HPF / WBC 1 /HPF   Sq Epi: x / Non Sq Epi: 0 /HPF / Bacteria: Negative     Troponin T, Serum: <0.01 ng/mL (02-10-23 @ 23:41)    Serum Pro-Brain Natriuretic Peptide: 7194 pg/mL (02-10-23 @ 23:41)

## 2023-02-11 NOTE — CONSULT NOTE ADULT - SUBJECTIVE AND OBJECTIVE BOX
Cardiologist: Cary    HPI:  72-year-old female to ED with chest pain and cough.  Patient with 2 weeks of symptoms no injury or fall patient here with family as today worsening pressure in her chest palpitations and not feeling well.  On arrival patient febrile  changes on EKG with depressions so STEMI code activated and cardiology at bedside.     PAST MEDICAL & SURGICAL HISTORY      FAMILY HISTORY:  FAMILY HISTORY:    [ ] no pertinent family history of premature cardiovascular disease in first degree relatives.  Mother:   Father:   Siblings:     SOCIAL HISTORY:  []smoker  []Alcohol  []Drug    ALLERGIES:  No Known Allergies      MEDICATIONS:  MEDICATIONS  (STANDING):  vancomycin  IVPB. 500 milliGRAM(s) IV Intermittent once    MEDICATIONS  (PRN):    HOME MEDICATIONS:  Home Medications:    VITALS:   T(F): 102 (02-10 @ 23:28), Max: 102 (02-10 @ 23:28)  HR: 117 (02-10 @ 23:28) (117 - 127)  BP: 172/83 (02-10 @ 23:28) (139/77 - 172/83)  BP(mean): --  RR: 20 (02-10 @ 23:28) (20 - 20)  SpO2: 97% (02-10 @ 23:28) (97% - 97%)    I&O's Summary      REVIEW OF SYSTEMS:    Negative except as mentioned in HPI    PHYSICAL EXAM:  NEURO: Awake , alert and oriented  GEN: Not in acute distress  NECK: No JVD  LUNGS: Clear to auscultation bilaterally   CARDIOVASCULAR: S1/S2 present, RRR , no murmurs or rubs, no carotid bruits,  + PP bilaterally  ABD: Soft, non-tender, non-distended, +BS  EXT: No JOSEPH  SKIN: Intact    LABS:                        13.5   15.17 )-----------( 246      ( 10 Feb 2023 23:41 )             40.7     02-10    136  |  99  |  20  ----------------------------<  109<H>  3.7   |  26  |  1.0    Ca    8.9      10 Feb 2023 23:41    TPro  6.2  /  Alb  3.9  /  TBili  0.7  /  DBili  x   /  AST  13  /  ALT  15  /  AlkPhos  118<H>  02-10    PT/INR - ( 10 Feb 2023 23:41 )   PT: 14.90 sec;   INR: 1.30 ratio         PTT - ( 10 Feb 2023 23:41 )  PTT:32.9 sec  Troponin T, Serum: <0.01 ng/mL (02-10-23 @ 23:41)    CARDIAC MARKERS ( 10 Feb 2023 23:41 )  x     / <0.01 ng/mL / x     / x     / x       Serum Pro-Brain Natriuretic Peptide: 7194 pg/mL (02-10-23 @ 23:41)    RADIOLOGY:  -CXR:    -TTE:    -CCTA:    -STRESS TEST:  < from: NM Nuclear Stress Pharmacologic Multiple (01.10.20 @ 11:54) >  Impression:  1. IV Adenosine Dual Isotope Study which was negative with respect to symptoms and EKG changes.  2. Myocardial perfusion imaging reveals no fixed no reperfusion defects  3. Gated imaging reveals septal motion consistent with an interventricular conduction defect with global hypokinesia with normal thickening with ejection fraction mildly reduced at 45%    < end of copied text >    -CATHETERIZATION:    ECG:    TELEMETRY EVENTS: sinus, LVH   Cardiologist: Cary    HPI:  72-year-old female to ED with chest pain and cough.  Patient with 2 weeks of symptoms no injury or fall patient here with family as today worsening pressure in her chest palpitations and not feeling well.  On arrival patient febrile  changes on EKG with depressions so STEMI code activated and cardiology at bedside.     PAST MEDICAL & SURGICAL HISTORY  as above    FAMILY HISTORY:  FAMILY HISTORY:    [ ] no pertinent family history of premature cardiovascular disease in first degree relatives.  Mother:   Father:   Siblings:     SOCIAL HISTORY:  []smoker  []Alcohol  []Drug    ALLERGIES:  No Known Allergies      MEDICATIONS:  MEDICATIONS  (STANDING):  vancomycin  IVPB. 500 milliGRAM(s) IV Intermittent once    MEDICATIONS  (PRN):    HOME MEDICATIONS:  Home Medications:    VITALS:   T(F): 102 (02-10 @ 23:28), Max: 102 (02-10 @ 23:28)  HR: 117 (02-10 @ 23:28) (117 - 127)  BP: 172/83 (02-10 @ 23:28) (139/77 - 172/83)  BP(mean): --  RR: 20 (02-10 @ 23:28) (20 - 20)  SpO2: 97% (02-10 @ 23:28) (97% - 97%)    I&O's Summary      REVIEW OF SYSTEMS:    Negative except as mentioned in HPI    PHYSICAL EXAM:  NEURO: Awake , alert and oriented  GEN: Not in acute distress  NECK: No JVD  LUNGS: Clear to auscultation bilaterally   CARDIOVASCULAR: S1/S2 present, RRR , no murmurs or rubs, no carotid bruits,  + PP bilaterally  ABD: Soft, non-tender, non-distended, +BS  EXT: No JOSEPH  SKIN: Intact    LABS:                        13.5   15.17 )-----------( 246      ( 10 Feb 2023 23:41 )             40.7     02-10    136  |  99  |  20  ----------------------------<  109<H>  3.7   |  26  |  1.0    Ca    8.9      10 Feb 2023 23:41    TPro  6.2  /  Alb  3.9  /  TBili  0.7  /  DBili  x   /  AST  13  /  ALT  15  /  AlkPhos  118<H>  02-10    PT/INR - ( 10 Feb 2023 23:41 )   PT: 14.90 sec;   INR: 1.30 ratio         PTT - ( 10 Feb 2023 23:41 )  PTT:32.9 sec  Troponin T, Serum: <0.01 ng/mL (02-10-23 @ 23:41)    CARDIAC MARKERS ( 10 Feb 2023 23:41 )  x     / <0.01 ng/mL / x     / x     / x       Serum Pro-Brain Natriuretic Peptide: 7194 pg/mL (02-10-23 @ 23:41)    RADIOLOGY:  -CXR:    -TTE:    -CCTA:    -STRESS TEST:  < from: NM Nuclear Stress Pharmacologic Multiple (01.10.20 @ 11:54) >  Impression:  1. IV Adenosine Dual Isotope Study which was negative with respect to symptoms and EKG changes.  2. Myocardial perfusion imaging reveals no fixed no reperfusion defects  3. Gated imaging reveals septal motion consistent with an interventricular conduction defect with global hypokinesia with normal thickening with ejection fraction mildly reduced at 45%    < end of copied text >    -CATHETERIZATION:    ECG:    TELEMETRY EVENTS: sinus, LVH

## 2023-02-11 NOTE — H&P ADULT - ATTENDING COMMENTS
HPI as above.  Interval history: Pt seen and examined at bedside. No cp or sob.  Pt states that she had cough and fever at home.   Vital Signs (24 Hrs):  T(C): 36.6 (02-11-23 @ 07:00), Max: 38.9 (02-10-23 @ 23:28)  HR: 92 (02-11-23 @ 07:00) (92 - 127)  BP: 122/70 (02-11-23 @ 07:00) (121/60 - 172/83)  RR: 18 (02-11-23 @ 07:00) (18 - 20)  SpO2: 98% (02-11-23 @ 07:00) (95% - 98%)  Wt(kg): --  Daily     Daily     I&O's Summary    PHYSICAL EXAM:  GENERAL: NAD, well-developed  HEAD:  Atraumatic, Normocephalic  EYES: EOMI, PERRLA, conjunctiva and sclera clear  NECK: Supple, No JVD  CHEST/LUNG: Clear to auscultation bilaterally; No wheeze  HEART: Regular rate and rhythm; No murmurs, rubs, or gallops  ABDOMEN: Soft, Nontender, Nondistended; Bowel sounds present  EXTREMITIES:  2+ Peripheral Pulses, No clubbing, cyanosis, or edema  PSYCH: AAOx3  NEUROLOGY: non-focal  SKIN: No rashes or lesions  Labs reviewed  Imaging reviewed independently and reviewed read  < from: Xray Chest 1 View-PORTABLE IMMEDIATE (02.11.23 @ 00:17) >    Impression:    No radiographic evidence of cardiopulmonary disease.    < end of copied text >      EKG reviewed independently and reviewed read  < from: 12 Lead ECG (02.10.23 @ 23:14) >    Diagnosis Line Sinus tachycardia  Left axis deviation  Left ventricular hypertrophy with QRS widening ( Sokolow-Rodriguez , Hayes  product , Romhilt-Scherer )  Nonspecific ST and T wave abnormality , consider lateral ischemia  Abnormal ECG    < end of copied text >      Plan  #sepsis POA 2/2 Enterovirus, no pneumonia on cxr- ID appreciated dw ID, recc DC antibiotics, supportive care  #Elevated BNP in setting of viral infection - r/o viral pericarditis  - check echo   -  dc cardiac telemonitoring   - trop neg x2     #rest as above     #Progress Note Handoff  Pending (specify):  follow up above  Family discussion: dw family at bedside  Disposition: home, PT   Decision to admit the pt is based on acuity as above  High risk given above acuity and comorbidities

## 2023-02-12 LAB
ALBUMIN SERPL ELPH-MCNC: 3.4 G/DL — LOW (ref 3.5–5.2)
ALP SERPL-CCNC: 94 U/L — SIGNIFICANT CHANGE UP (ref 30–115)
ALT FLD-CCNC: 11 U/L — SIGNIFICANT CHANGE UP (ref 0–41)
ANION GAP SERPL CALC-SCNC: 9 MMOL/L — SIGNIFICANT CHANGE UP (ref 7–14)
AST SERPL-CCNC: 11 U/L — SIGNIFICANT CHANGE UP (ref 0–41)
BILIRUB SERPL-MCNC: 0.3 MG/DL — SIGNIFICANT CHANGE UP (ref 0.2–1.2)
BUN SERPL-MCNC: 10 MG/DL — SIGNIFICANT CHANGE UP (ref 10–20)
CALCIUM SERPL-MCNC: 8.5 MG/DL — SIGNIFICANT CHANGE UP (ref 8.4–10.5)
CHLORIDE SERPL-SCNC: 105 MMOL/L — SIGNIFICANT CHANGE UP (ref 98–110)
CO2 SERPL-SCNC: 25 MMOL/L — SIGNIFICANT CHANGE UP (ref 17–32)
CREAT SERPL-MCNC: 0.8 MG/DL — SIGNIFICANT CHANGE UP (ref 0.7–1.5)
CULTURE RESULTS: NO GROWTH — SIGNIFICANT CHANGE UP
EGFR: 79 ML/MIN/1.73M2 — SIGNIFICANT CHANGE UP
FOLATE SERPL-MCNC: 9.6 NG/ML — SIGNIFICANT CHANGE UP
GLUCOSE SERPL-MCNC: 91 MG/DL — SIGNIFICANT CHANGE UP (ref 70–99)
HCT VFR BLD CALC: 36.4 % — LOW (ref 37–47)
HGB BLD-MCNC: 11.6 G/DL — LOW (ref 12–16)
MAGNESIUM SERPL-MCNC: 2 MG/DL — SIGNIFICANT CHANGE UP (ref 1.8–2.4)
MCHC RBC-ENTMCNC: 25.4 PG — LOW (ref 27–31)
MCHC RBC-ENTMCNC: 31.9 G/DL — LOW (ref 32–37)
MCV RBC AUTO: 79.8 FL — LOW (ref 81–99)
NRBC # BLD: 0 /100 WBCS — SIGNIFICANT CHANGE UP (ref 0–0)
PLATELET # BLD AUTO: 198 K/UL — SIGNIFICANT CHANGE UP (ref 130–400)
POTASSIUM SERPL-MCNC: 4 MMOL/L — SIGNIFICANT CHANGE UP (ref 3.5–5)
POTASSIUM SERPL-SCNC: 4 MMOL/L — SIGNIFICANT CHANGE UP (ref 3.5–5)
PROT SERPL-MCNC: 5.5 G/DL — LOW (ref 6–8)
RBC # BLD: 4.56 M/UL — SIGNIFICANT CHANGE UP (ref 4.2–5.4)
RBC # FLD: 14.3 % — SIGNIFICANT CHANGE UP (ref 11.5–14.5)
SODIUM SERPL-SCNC: 139 MMOL/L — SIGNIFICANT CHANGE UP (ref 135–146)
SPECIMEN SOURCE: SIGNIFICANT CHANGE UP
VIT B12 SERPL-MCNC: 255 PG/ML — SIGNIFICANT CHANGE UP (ref 232–1245)
WBC # BLD: 7.98 K/UL — SIGNIFICANT CHANGE UP (ref 4.8–10.8)
WBC # FLD AUTO: 7.98 K/UL — SIGNIFICANT CHANGE UP (ref 4.8–10.8)

## 2023-02-12 PROCEDURE — 99233 SBSQ HOSP IP/OBS HIGH 50: CPT

## 2023-02-12 RX ADMIN — Medication 3 MILLILITER(S): at 20:50

## 2023-02-12 RX ADMIN — Medication 81 MILLIGRAM(S): at 13:48

## 2023-02-12 RX ADMIN — Medication 3 MILLILITER(S): at 14:03

## 2023-02-12 RX ADMIN — Medication 3 MILLILITER(S): at 08:08

## 2023-02-12 RX ADMIN — ENOXAPARIN SODIUM 40 MILLIGRAM(S): 100 INJECTION SUBCUTANEOUS at 13:48

## 2023-02-12 RX ADMIN — LISINOPRIL 10 MILLIGRAM(S): 2.5 TABLET ORAL at 07:00

## 2023-02-12 NOTE — PATIENT PROFILE ADULT - CONTRAINDICATIONS & PRECAUTIONS (SELECT ALL THAT APPLY)
Patient/surrogate refused vaccine.../A severe allergic reaction (e.g. anaphylaxis) to any component of the applicable vaccine being administered

## 2023-02-12 NOTE — PROGRESS NOTE ADULT - SUBJECTIVE AND OBJECTIVE BOX
SABA FÁTIMA  [facility] 007 B ([facility])      Patient is a 72y old  Female who presents with a chief complaint of sepsis (11 Feb 2023 11:36)        Interval events:  Patient seen and examined at bedside. No acute events overnight. Says she does not have chest pain but feels like her heart rate is fast. Wants to go home.     -PMHx: Hypertension      -PSHx:No significant past surgical history            REVIEW OF SYSTEMS:  CONSTITUTIONAL: No fever, weight loss, or fatigue  RESPIRATORY: No cough, wheezing, chills or hemoptysis; No shortness of breath  CARDIOVASCULAR: as above   GASTROINTESTINAL: No abdominal or epigastric pain. No nausea, vomiting, or hematemesis; No diarrhea or constipation. No melena or hematochezia.  NEUROLOGICAL: No headaches  LYMPH NODES: No enlarged glands  MUSCULOSKELETAL: No joint pain or swelling; No muscle, back, or extremity pain      T(C): , Max: 36.5 (02-11-23 @ 15:28)  HR: 101 (02-12-23 @ 05:00)  BP: 104/64 (02-12-23 @ 05:00)  RR: 18 (02-12-23 @ 05:00)  SpO2: 98% (02-11-23 @ 15:28)  CAPILLARY BLOOD GLUCOSE      PHYSICAL EXAM  GENERAL: NAD  HEAD:  NCAT, EOMI, PERRL, MMM  NECK: Supple, Nontender  NERVOUS SYSTEM: AAOx3, NFD  CHEST/LUNG: + bs b/l w ronchi  HEART: +s1s2, tachycardia, regular rhythm   ABDOMEN: soft, NT/ND  EXTREMITIES: pp, no edema    Consultant(s) Notes Reviewed:  [x ] YES  [ ] NO  Care Discussed with Consultants/Other Providers [ x] YES  [ ] NO    LABS:          11.6  7.98  )-------(198          36.4  N=--  L=--  MCV=79.8    139|105|10  ------------------<91  4.0|25|0.8  eGFR:--  Ca:8.5      PT/INR - ( 10 Feb 2023 23:41 )   PT: 14.90 sec;   INR: 1.30 ratio         PTT - ( 10 Feb 2023 23:41 )  PTT:32.9 sec      Microbiology:    Culture - Urine (collected 02-11-23 @ 00:43)  Source: Clean Catch Clean Catch (Midstream)  Final Report (02-12-23 @ 11:54):    No growth    Culture - Blood (collected 02-10-23 @ 23:55)  Source: .Blood Blood-Peripheral  Preliminary Report (02-12-23 @ 11:01):    No growth to date.    Culture - Blood (collected 02-10-23 @ 23:55)  Source: .Blood Blood-Peripheral  Preliminary Report (02-12-23 @ 11:01):    No growth to date.        RADIOLOGY & ADDITIONAL TESTS:  < from: Xray Chest 1 View-PORTABLE IMMEDIATE (02.11.23 @ 00:17) >    Impression:    No radiographic evidence of cardiopulmonary disease.    < end of copied text >    < from: 12 Lead ECG (02.10.23 @ 23:14) >  Diagnosis Line Sinus tachycardia  Left axis deviation  Left ventricular hypertrophy with QRS widening ( Sokolow-Rodriguez , Guild  product , Romhilt-Scherer )  Nonspecific ST and T wave abnormality , consider lateral ischemia  Abnormal ECG    < end of copied text >        Medications:  acetaminophen     Tablet .. 650 milliGRAM(s) Oral every 6 hours PRN  albuterol    90 MICROgram(s) HFA Inhaler 2 Puff(s) Inhalation every 6 hours  albuterol/ipratropium for Nebulization 3 milliLiter(s) Nebulizer every 6 hours  aluminum hydroxide/magnesium hydroxide/simethicone Suspension 30 milliLiter(s) Oral every 4 hours PRN  aspirin  chewable 81 milliGRAM(s) Oral daily  enoxaparin Injectable 40 milliGRAM(s) SubCutaneous every 24 hours  guaifenesin/dextromethorphan Oral Liquid 10 milliLiter(s) Oral every 4 hours PRN  lisinopril 10 milliGRAM(s) Oral daily

## 2023-02-13 PROBLEM — I10 ESSENTIAL (PRIMARY) HYPERTENSION: Chronic | Status: ACTIVE | Noted: 2023-02-11

## 2023-02-13 LAB
HCV AB S/CO SERPL IA: 0.04 COI — SIGNIFICANT CHANGE UP
HCV AB SERPL-IMP: SIGNIFICANT CHANGE UP
TSH SERPL-MCNC: 1.62 UIU/ML — SIGNIFICANT CHANGE UP (ref 0.27–4.2)

## 2023-02-13 PROCEDURE — 99223 1ST HOSP IP/OBS HIGH 75: CPT

## 2023-02-13 PROCEDURE — 99232 SBSQ HOSP IP/OBS MODERATE 35: CPT

## 2023-02-13 PROCEDURE — 93306 TTE W/DOPPLER COMPLETE: CPT | Mod: 26

## 2023-02-13 PROCEDURE — 71045 X-RAY EXAM CHEST 1 VIEW: CPT | Mod: 26

## 2023-02-13 RX ORDER — ATORVASTATIN CALCIUM 80 MG/1
40 TABLET, FILM COATED ORAL AT BEDTIME
Refills: 0 | Status: DISCONTINUED | OUTPATIENT
Start: 2023-02-13 | End: 2023-02-14

## 2023-02-13 RX ORDER — METOPROLOL TARTRATE 50 MG
25 TABLET ORAL EVERY 8 HOURS
Refills: 0 | Status: DISCONTINUED | OUTPATIENT
Start: 2023-02-13 | End: 2023-02-14

## 2023-02-13 RX ADMIN — Medication 81 MILLIGRAM(S): at 12:28

## 2023-02-13 RX ADMIN — Medication 25 MILLIGRAM(S): at 21:22

## 2023-02-13 RX ADMIN — LISINOPRIL 10 MILLIGRAM(S): 2.5 TABLET ORAL at 06:08

## 2023-02-13 RX ADMIN — ATORVASTATIN CALCIUM 40 MILLIGRAM(S): 80 TABLET, FILM COATED ORAL at 21:22

## 2023-02-13 RX ADMIN — Medication 3 MILLILITER(S): at 13:36

## 2023-02-13 RX ADMIN — ENOXAPARIN SODIUM 40 MILLIGRAM(S): 100 INJECTION SUBCUTANEOUS at 13:47

## 2023-02-13 NOTE — CONSULT NOTE ADULT - ASSESSMENT
A 61 yo F with PMHx HTN admitted for sepsis found to be Entero/Rhinovirus found to have EF of 20% on echo    Recommendations:     # Suspected sepsis induced cardiomyopathy vs viral myocarditis vs CHF   - Clinically euvolemic. Poor historian, denies Entresto, Lasix, BB  - Echo showing EF 20%, mild-mod AR, mod MR  - Follow with Dr Fitzpatrick  - Need records of previous w/u     ** Attending to see  A 63 yo F with PMHx HTN admitted for sepsis found to be Entero/Rhinovirus found to have EF of 20% on echo    Recommendations:     # Suspected sepsis induced cardiomyopathy vs viral myocarditis vs CHF   - Clinically euvolemic. Poor historian, denies Entresto, Lasix, BB  - Echo showing EF 20%, mild-mod AR, mod MR  - Follow with Dr Esparza   - Need records of previous w/u     ** Attending to see  A 63 yo F with PMHx HTN admitted for sepsis found to be Entero/Rhinovirus found to have EF of 20% on echo    Recommendations:     # Suspected sepsis induced cardiomyopathy vs viral myocarditis vs CHF   - Clinically euvolemic. Poor historian, denies Entresto, Lasix, BB  - Echo showing EF 20%, mild-mod AR, mod MR  - BNP 4242  - Follow with Dr Esparza   - Need records of previous w/u     ** Attending to see  A 63 yo F with PMHx HTN admitted for sepsis found to be Entero/Rhinovirus found to have EF of 20% on echo    Recommendations:     # HFrEF possibly worsened by sepsis induced cardiomyopathy vs viral myocarditis  - Clinically euvolemic. Poor historian, denies Entresto, Lasix, JROGE  - BNP 7194  - Echo showing EF 20%, mild-mod AR, mod MR  - Follows with Dr Esparza: previous echo EF <35%  - C/w Lisinopril 10mg po QD  - Start Metoprolol tartrate 25mg po Q8H  - Can f/u with Dr Esparza as OP for further w/u and medication optimization      A 73 yo F with PMHx HTN admitted for sepsis found to be Entero/Rhinovirus found to have EF of 20% on echo    Recommendations:     # HFrEF possibly worsened by sepsis induced cardiomyopathy vs viral myocarditis  - Clinically euvolemic. Poor historian, denies Entresto, Lasix, JORGE  - BNP 7194  - Echo showing EF 20%, mild-mod AR, mod MR  - Follows with Dr Esparza: previous echo EF <35%  - C/w Lisinopril 10mg po QD  - Start Metoprolol tartrate 25mg po Q8H; switch to metoprolol succinate on discharge  - Can f/u with Dr Esparza as OP for further w/u and medication optimization

## 2023-02-13 NOTE — CONSULT NOTE ADULT - ATTENDING COMMENTS
non card cp  ecg reviewed - lvh with sec stt changes  no further card w/up   recall prn
Briefly, 72 year old woman for whom cardiology is consulted for reduced LVEF on echo. Echo reviewed and her LVEF is severely reduced ~20%. Her outpatient cardiologist documented that her LVEF is <35%. Unable to see the images of outpatient echo but this could mean her LVEF has remained stable. Regardless, she is asymptomatic from a cardiac perspective. She does not need an inpatient ischemia evaluation. Would optimize her medically with GDMT. Continue lisinopril, which can be changed to Entresto outpatient. Would start metoprolol tartrate and convert to succinate on discharge. She is euvolemic on exam so does not need any diuretics. Would start AA and SGLT2 inhibitor as outpatient if able. She can follow up with Dr. Esparza, her outpatient cardiologist, on discharge.     Thank you for this consult. Please call/MS teams with questions.

## 2023-02-13 NOTE — PROGRESS NOTE ADULT - SUBJECTIVE AND OBJECTIVE BOX
FÁTIMA WALTER 72y Female  MRN#: [id number]   CODE STATUS: FULL CODE     Admission Date: 02-11-23  Hospital Day: 2d    Pt is currently admitted with the primary diagnosis of SOB     SUBJECTIVE  Hospital Course  73 y/o woman w PMHx of HTN, ?HFrEF reports prior EF of 30s and follows w Dr Esparza, presents for CP and SOB, in ED initially was code STEMI but cancelled, trop neg <0.01 x2, admitted 2/11/23 for further workup of CP. (+)enterovirus and fever 102.     During admission had echo w EF <20%, no prior echo in records and pt denied h/o heart failure but when told her EF was <20%, stated that it was 30s before. Informed pt she has heart failure.     2/13/23 day 2: EF <20%, cardio consulted.     ADMITTING HISTORY  72 F w h/o HTN presents for generalized symptoms of sob, chest pain and fatigue. In ED, there was some concern for ST changes on EKG so code STEMI was call but later cancelled after evaluated by cardiology. Patient with no complaints at time of my exam; ROS neg.     Initial Vitals showed, T 99.6, /77, , RR20, SpO2 97% on RA.   Labs significant for wbc 15.17 and BNP 7194  Later on it was reported that patient spiked a fever to 102 and RVP came back positive for Enterovirus.    Patient was admitted for sepsis workup.       Overnight events  None     Subjective complaints  States feeling well.       T(C): 36.2 (02-13-23 @ 13:48)  T(F): 97.2 (02-13-23 @ 13:48)  HR: 99 (02-13-23 @ 13:48)  BP: 114/54 (02-13-23 @ 13:48)  RR: 18 (02-13-23 @ 13:48)  SpO2: --    PHYSICAL EXAM:  GENERAL: NAD, lying in bed comfortably  HEAD:  Atraumatic, Normocephalic  EYES: EOMI, sclera clear  ENT: Moist mucous membranes  NECK: Supple, trachea midline  CHEST/LUNG: Scant crackles b/l bases. Unlabored respirations, on RA. Walking w/o dyspnea.   HEART: Regular rate and rhythm; No appreciable murmurs, rubs, or gallops  ABDOMEN: (+)BS; Soft, nontender, nondistended  EXTREMITIES:  2+ Radial Pulses, brisk capillary refill. No edema.   NERVOUS SYSTEM:  A&Ox3, no noted facial droop. Moving all extremities w/o difficulty.   SKIN: No rashes or lesions to b/l forearm, face.         Present Today:   - Garcia:  No [ X ], Yes [  ] : Indication:   - Type of IV Access:       .. CVC/Piccline:  No [ X ], Yes [  ] : Indication:       .. Midline: No [ X ], Yes [  ] : Indication:                                              OBJECTIVE  PAST MEDICAL & SURGICAL HISTORY  Hypertension    No significant past surgical history                                                ALLERGIES:  No Known Allergies                           HOME MEDICATIONS  Home Medications:  lisinopril 10 mg oral tablet: 1 tab(s) orally once a day (11 Feb 2023 04:56)                           MEDICATIONS:  STANDING MEDICATIONS  albuterol    90 MICROgram(s) HFA Inhaler 2 Puff(s) Inhalation every 6 hours  albuterol/ipratropium for Nebulization 3 milliLiter(s) Nebulizer every 6 hours  aspirin  chewable 81 milliGRAM(s) Oral daily  atorvastatin 40 milliGRAM(s) Oral at bedtime  enoxaparin Injectable 40 milliGRAM(s) SubCutaneous every 24 hours  lisinopril 10 milliGRAM(s) Oral daily    PRN MEDICATIONS  acetaminophen     Tablet .. 650 milliGRAM(s) Oral every 6 hours PRN  aluminum hydroxide/magnesium hydroxide/simethicone Suspension 30 milliLiter(s) Oral every 4 hours PRN  guaifenesin/dextromethorphan Oral Liquid 10 milliLiter(s) Oral every 4 hours PRN                                            ------------------------------------------------------------  T(C): 36.2 (02-13-23 @ 13:48), Max: 36.2 (02-13-23 @ 06:08)  T(F): 97.2 (02-13-23 @ 13:48), Max: 97.2 (02-13-23 @ 06:08)  HR: 99 (02-13-23 @ 13:48) (98 - 107)  BP: 114/54 (02-13-23 @ 13:48) (114/54 - 142/65)  RR: 18 (02-13-23 @ 13:48) (17 - 18)  SpO2: --                                               LABS:                        11.6   7.98  )-----------( 198      ( 12 Feb 2023 07:38 )             36.4     02-12    139  |  105  |  10  ----------------------------<  91  4.0   |  25  |  0.8    Ca    8.5      12 Feb 2023 07:38  Mg     2.0     02-12    TPro  5.5<L>  /  Alb  3.4<L>  /  TBili  0.3  /  DBili  x   /  AST  11  /  ALT  11  /  AlkPhos  94  02-12                Culture - Blood (collected 11 Feb 2023 08:24)  Source: .Blood Blood  Preliminary Report (12 Feb 2023 19:02):    No growth to date.    Culture - Blood (collected 11 Feb 2023 08:24)  Source: .Blood Blood  Preliminary Report (12 Feb 2023 19:02):    No growth to date.    Culture - Urine (collected 11 Feb 2023 00:43)  Source: Clean Catch Clean Catch (Midstream)  Final Report (12 Feb 2023 11:54):    No growth    Culture - Blood (collected 10 Feb 2023 23:55)  Source: .Blood Blood-Peripheral  Preliminary Report (12 Feb 2023 11:01):    No growth to date.    Culture - Blood (collected 10 Feb 2023 23:55)  Source: .Blood Blood-Peripheral  Preliminary Report (12 Feb 2023 11:01):    No growth to date.                    RADIOLOGY:

## 2023-02-13 NOTE — CONSULT NOTE ADULT - SUBJECTIVE AND OBJECTIVE BOX
HPI:  72 F w h/o HTN presents for generalized symptoms of sob, chest pain and fatigue. In ED, there was some concern for ST changes on EKG so code STEMI was call but later cancelled after evaluated by cardiology. Patient with no complaints at time of my exam; ROS neg.     Initial Vitals showed, T 99.6, /77, , RR20, SpO2 97% on RA.   Labs significant for wbc 15.17 and BNP 7194  Later on it was reported that patient spiked a fever to 102 and RVP came back positive for Enterovirus.    Patient was admitted for sepsis workup.  (11 Feb 2023 04:50)      PAST MEDICAL & SURGICAL HISTORY:  Hypertension      No significant past surgical history          MEDICATIONS  (STANDING):  albuterol    90 MICROgram(s) HFA Inhaler 2 Puff(s) Inhalation every 6 hours  albuterol/ipratropium for Nebulization 3 milliLiter(s) Nebulizer every 6 hours  aspirin  chewable 81 milliGRAM(s) Oral daily  enoxaparin Injectable 40 milliGRAM(s) SubCutaneous every 24 hours  lisinopril 10 milliGRAM(s) Oral daily    MEDICATIONS  (PRN):  acetaminophen     Tablet .. 650 milliGRAM(s) Oral every 6 hours PRN Temp greater or equal to 38C (100.4F), Mild Pain (1 - 3), Moderate Pain (4 - 6)  aluminum hydroxide/magnesium hydroxide/simethicone Suspension 30 milliLiter(s) Oral every 4 hours PRN Dyspepsia  guaifenesin/dextromethorphan Oral Liquid 10 milliLiter(s) Oral every 4 hours PRN Cough      FAMILY HISTORY:  No pertinent family history in first degree relatives        Review of Systems:  CONSTITUTIONAL: No fever, weight loss, or fatigue  EYES: No eye pain, visual disturbances, or discharge  ENMT:  No difficulty hearing, tinnitus, vertigo; No sinus or throat pain  NECK: No pain or stiffness  BREASTS: No pain, masses, or nipple discharge  RESPIRATORY: No cough, wheezing, chills or hemoptysis; No shortness of breath  CARDIOVASCULAR: No chest pain, palpitations, dizziness, or leg swelling  GASTROINTESTINAL: No abdominal or epigastric pain. No nausea, vomiting, or hematemesis; No diarrhea or constipation. No melena or hematochezia.  GENITOURINARY: No dysuria, frequency, hematuria, or incontinence  NEUROLOGICAL: No headaches, memory loss, loss of strength, numbness, or tremors  SKIN: No itching, burning, rashes, or lesions   LYMPH NODES: No enlarged glands  ENDOCRINE: No heat or cold intolerance; No hair loss  MUSCULOSKELETAL: No joint pain or swelling; No muscle, back, or extremity pain  PSYCHIATRIC: No depression, anxiety, mood swings, or difficulty sleeping  HEME/LYMPH: No easy bruising, or bleeding gums  ALLERY AND IMMUNOLOGIC: No hives or eczema    SOCIAL HISTORY:    Vital Signs Last 24 Hrs  T(C): 36.2 (13 Feb 2023 06:08), Max: 36.7 (12 Feb 2023 13:39)  T(F): 97.2 (13 Feb 2023 06:08), Max: 98 (12 Feb 2023 13:39)  HR: 98 (13 Feb 2023 06:08) (88 - 107)  BP: 142/65 (13 Feb 2023 06:08) (110/66 - 142/65)  BP(mean): --  RR: 18 (13 Feb 2023 06:08) (17 - 18)  SpO2: --        Physical Exam:  GENERAL: NAD, well-groomed, well-developed  HEAD:  NCAT  EYES: EOMI, PERRLA, conjunctiva clear  ENMT: No tonsillar erythema, exudates, or enlargement; Moist mucous membranes, Good dentition, No lesions  NECK: Supple, No JVD, Normal thyroid  NERVOUS SYSTEM: AAOX4, Good concentration; Motor Strength 5/5 B/L upper and lower extremities; DTRs 2+ intact and symmetric  CHEST/LUNG: CTA b/l no w/r/r  HEART: +s1s2 RRR no m/g/r  ABDOMEN: soft, NT/ND (+) bs, no HSM  EXTREMITIES:  2+ Peripheral Pulses, No c/c/e  LYMPH: No lymphadenopathy noted  SKIN: No rashes or lesions    ECG:    ECHO:    I&O's Detail      LABS:                        11.6   7.98  )-----------( 198      ( 12 Feb 2023 07:38 )             36.4     02-12    139  |  105  |  10  ----------------------------<  91  4.0   |  25  |  0.8    Ca    8.5      12 Feb 2023 07:38  Mg     2.0     02-12    TPro  5.5<L>  /  Alb  3.4<L>  /  TBili  0.3  /  DBili  x   /  AST  11  /  ALT  11  /  AlkPhos  94  02-12            I&O's Summary    BNP    RADIOLOGY & ADDITIONAL STUDIES:   HPI:  72 F w h/o HTN presents for generalized symptoms of sob, chest pain and fatigue. In ED, there was some concern for ST changes on EKG so code STEMI was call but later cancelled after evaluated by cardiology. Patient with no complaints at time of my exam; ROS neg.     Initial Vitals showed, T 99.6, /77, , RR20, SpO2 97% on RA.   Labs significant for wbc 15.17 and BNP 7194  Later on it was reported that patient spiked a fever to 102 and RVP came back positive for Enterovirus.    Patient was admitted for sepsis workup.  (11 Feb 2023 04:50)      PAST MEDICAL & SURGICAL HISTORY:  Hypertension      No significant past surgical history          MEDICATIONS  (STANDING):  albuterol    90 MICROgram(s) HFA Inhaler 2 Puff(s) Inhalation every 6 hours  albuterol/ipratropium for Nebulization 3 milliLiter(s) Nebulizer every 6 hours  aspirin  chewable 81 milliGRAM(s) Oral daily  enoxaparin Injectable 40 milliGRAM(s) SubCutaneous every 24 hours  lisinopril 10 milliGRAM(s) Oral daily    MEDICATIONS  (PRN):  acetaminophen     Tablet .. 650 milliGRAM(s) Oral every 6 hours PRN Temp greater or equal to 38C (100.4F), Mild Pain (1 - 3), Moderate Pain (4 - 6)  aluminum hydroxide/magnesium hydroxide/simethicone Suspension 30 milliLiter(s) Oral every 4 hours PRN Dyspepsia  guaifenesin/dextromethorphan Oral Liquid 10 milliLiter(s) Oral every 4 hours PRN Cough      FAMILY HISTORY:  No pertinent family history in first degree relatives        Review of Systems:  CONSTITUTIONAL: No fever, weight loss, or fatigue  EYES: No eye pain, visual disturbances, or discharge  ENMT:  No difficulty hearing, tinnitus, vertigo; No sinus or throat pain  NECK: No pain or stiffness  BREASTS: No pain, masses, or nipple discharge  RESPIRATORY: No cough, wheezing, chills or hemoptysis; No shortness of breath  CARDIOVASCULAR: No chest pain, palpitations, dizziness, or leg swelling  GASTROINTESTINAL: No abdominal or epigastric pain. No nausea, vomiting, or hematemesis; No diarrhea or constipation. No melena or hematochezia.  GENITOURINARY: No dysuria, frequency, hematuria, or incontinence  NEUROLOGICAL: No headaches, memory loss, loss of strength, numbness, or tremors  SKIN: No itching, burning, rashes, or lesions   LYMPH NODES: No enlarged glands  ENDOCRINE: No heat or cold intolerance; No hair loss  MUSCULOSKELETAL: No joint pain or swelling; No muscle, back, or extremity pain  PSYCHIATRIC: No depression, anxiety, mood swings, or difficulty sleeping  HEME/LYMPH: No easy bruising, or bleeding gums  ALLERY AND IMMUNOLOGIC: No hives or eczema    SOCIAL HISTORY:    Vital Signs Last 24 Hrs  T(C): 36.2 (13 Feb 2023 06:08), Max: 36.7 (12 Feb 2023 13:39)  T(F): 97.2 (13 Feb 2023 06:08), Max: 98 (12 Feb 2023 13:39)  HR: 98 (13 Feb 2023 06:08) (88 - 107)  BP: 142/65 (13 Feb 2023 06:08) (110/66 - 142/65)  BP(mean): --  RR: 18 (13 Feb 2023 06:08) (17 - 18)  SpO2: --        Physical Exam:  GENERAL: NAD, well-groomed, well-developed  HEAD:  NCAT  EYES: EOMI, PERRLA, conjunctiva clear  ENMT: No tonsillar erythema, exudates, or enlargement; Moist mucous membranes, Good dentition, No lesions  NECK: Supple, No JVD, Normal thyroid  NERVOUS SYSTEM: AAOX4, Good concentration; Motor Strength 5/5 B/L upper and lower extremities; DTRs 2+ intact and symmetric  CHEST/LUNG: CTA b/l no w/r/r  HEART: +s1s2 RRR no m/g/r  ABDOMEN: soft, NT/ND (+) bs, no HSM  EXTREMITIES:  2+ Peripheral Pulses, No c/c/e  LYMPH: No lymphadenopathy noted  SKIN: No rashes or lesions    ECG:    ECHO:   1. Left ventricular ejection fraction, by visual estimation, is <20%.  2. Severely decreased global left ventricular systolic function.  3. The left ventricular diastolic function could not be assessed in this study.  4. Left atrial enlargement.  5. Mild-to-moderate aortic regurgitation.  6. Moderate mitral regurgitation.      I&O's Detail      LABS:                        11.6   7.98  )-----------( 198      ( 12 Feb 2023 07:38 )             36.4     02-12    139  |  105  |  10  ----------------------------<  91  4.0   |  25  |  0.8    Ca    8.5      12 Feb 2023 07:38  Mg     2.0     02-12    TPro  5.5<L>  /  Alb  3.4<L>  /  TBili  0.3  /  DBili  x   /  AST  11  /  ALT  11  /  AlkPhos  94  02-12        I&O's Summary    BNP    RADIOLOGY & ADDITIONAL STUDIES:   HPI:  72 F w h/o HTN presents for generalized symptoms of sob, chest pain and fatigue. In ED, there was some concern for ST changes on EKG so code STEMI was call but later cancelled after evaluated by cardiology. Patient with no complaints at time of my exam; ROS neg.     Initial Vitals showed, T 99.6, /77, , RR20, SpO2 97% on RA.   Labs significant for wbc 15.17 and BNP 7194  Later on it was reported that patient spiked a fever to 102 and RVP came back positive for Enterovirus.    Patient was admitted for sepsis workup.  (11 Feb 2023 04:50)      PAST MEDICAL & SURGICAL HISTORY:  Hypertension      No significant past surgical history          MEDICATIONS  (STANDING):  albuterol    90 MICROgram(s) HFA Inhaler 2 Puff(s) Inhalation every 6 hours  albuterol/ipratropium for Nebulization 3 milliLiter(s) Nebulizer every 6 hours  aspirin  chewable 81 milliGRAM(s) Oral daily  enoxaparin Injectable 40 milliGRAM(s) SubCutaneous every 24 hours  lisinopril 10 milliGRAM(s) Oral daily    MEDICATIONS  (PRN):  acetaminophen     Tablet .. 650 milliGRAM(s) Oral every 6 hours PRN Temp greater or equal to 38C (100.4F), Mild Pain (1 - 3), Moderate Pain (4 - 6)  aluminum hydroxide/magnesium hydroxide/simethicone Suspension 30 milliLiter(s) Oral every 4 hours PRN Dyspepsia  guaifenesin/dextromethorphan Oral Liquid 10 milliLiter(s) Oral every 4 hours PRN Cough      FAMILY HISTORY:  No pertinent family history in first degree relatives        Review of Systems:  CONSTITUTIONAL: No fever, weight loss, or fatigue  EYES: No eye pain, visual disturbances, or discharge  ENMT:  No difficulty hearing, tinnitus, vertigo; No sinus or throat pain  NECK: No pain or stiffness  BREASTS: No pain, masses, or nipple discharge  RESPIRATORY: No cough, wheezing, chills or hemoptysis; No shortness of breath  CARDIOVASCULAR: No chest pain, palpitations, dizziness, or leg swelling  GASTROINTESTINAL: No abdominal or epigastric pain. No nausea, vomiting, or hematemesis; No diarrhea or constipation. No melena or hematochezia.  GENITOURINARY: No dysuria, frequency, hematuria, or incontinence  NEUROLOGICAL: No headaches, memory loss, loss of strength, numbness, or tremors  SKIN: No itching, burning, rashes, or lesions   LYMPH NODES: No enlarged glands  ENDOCRINE: No heat or cold intolerance; No hair loss  MUSCULOSKELETAL: No joint pain or swelling; No muscle, back, or extremity pain  PSYCHIATRIC: No depression, anxiety, mood swings, or difficulty sleeping  HEME/LYMPH: No easy bruising, or bleeding gums  ALLERY AND IMMUNOLOGIC: No hives or eczema    SOCIAL HISTORY:    Vital Signs Last 24 Hrs  T(C): 36.2 (13 Feb 2023 06:08), Max: 36.7 (12 Feb 2023 13:39)  T(F): 97.2 (13 Feb 2023 06:08), Max: 98 (12 Feb 2023 13:39)  HR: 98 (13 Feb 2023 06:08) (88 - 107)  BP: 142/65 (13 Feb 2023 06:08) (110/66 - 142/65)  RR: 18 (13 Feb 2023 06:08) (17 - 18)          Physical Exam:  GENERAL: NAD, well-groomed, well-developed  HEAD:  NCAT  EYES: EOMI, PERRLA, conjunctiva clear  ENMT: No tonsillar erythema, exudates, or enlargement; Moist mucous membranes, Good dentition, No lesions  NECK: Supple, No JVD, Normal thyroid  NERVOUS SYSTEM: AAOX4, Good concentration; Motor Strength 5/5 B/L upper and lower extremities; DTRs 2+ intact and symmetric  CHEST/LUNG: CTA b/l no w/r/r  HEART: +s1s2 RRR no m/g/r  ABDOMEN: soft, NT/ND (+) bs, no HSM  EXTREMITIES:  2+ Peripheral Pulses, No c/c/e  LYMPH: No lymphadenopathy noted  SKIN: No rashes or lesions    ECG:    ECHO: 2/13/23  1. Left ventricular ejection fraction, by visual estimation, is <20%.  2. Severely decreased global left ventricular systolic function.  3. The left ventricular diastolic function could not be assessed in this study.  4. Left atrial enlargement.  5. Mild-to-moderate aortic regurgitation.  6. Moderate mitral regurgitation.      I&O's Detail      LABS:                        11.6   7.98  )-----------( 198      ( 12 Feb 2023 07:38 )             36.4     02-12    139  |  105  |  10  ----------------------------<  91  4.0   |  25  |  0.8    Ca    8.5      12 Feb 2023 07:38  Mg     2.0     02-12    TPro  5.5<L>  /  Alb  3.4<L>  /  TBili  0.3  /  DBili  x   /  AST  11  /  ALT  11  /  AlkPhos  94  02-12        I&O's Summary    BNP    RADIOLOGY & ADDITIONAL STUDIES:   HPI:  72 F w h/o HTN presents for generalized symptoms of sob, chest pain and fatigue. In ED, there was some concern for ST changes on EKG so code STEMI was call but later cancelled after evaluated by cardiology. Patient with no complaints at time of my exam; ROS neg.     Initial Vitals showed, T 99.6, /77, , RR20, SpO2 97% on RA.   Labs significant for wbc 15.17 and BNP 7194  Later on it was reported that patient spiked a fever to 102 and RVP came back positive for Enterovirus.    Patient was admitted for sepsis workup.  (11 Feb 2023 04:50)      PAST MEDICAL & SURGICAL HISTORY:  Hypertension      No significant past surgical history          MEDICATIONS  (STANDING):  albuterol    90 MICROgram(s) HFA Inhaler 2 Puff(s) Inhalation every 6 hours  albuterol/ipratropium for Nebulization 3 milliLiter(s) Nebulizer every 6 hours  aspirin  chewable 81 milliGRAM(s) Oral daily  enoxaparin Injectable 40 milliGRAM(s) SubCutaneous every 24 hours  lisinopril 10 milliGRAM(s) Oral daily    MEDICATIONS  (PRN):  acetaminophen     Tablet .. 650 milliGRAM(s) Oral every 6 hours PRN Temp greater or equal to 38C (100.4F), Mild Pain (1 - 3), Moderate Pain (4 - 6)  aluminum hydroxide/magnesium hydroxide/simethicone Suspension 30 milliLiter(s) Oral every 4 hours PRN Dyspepsia  guaifenesin/dextromethorphan Oral Liquid 10 milliLiter(s) Oral every 4 hours PRN Cough      FAMILY HISTORY:  No pertinent family history in first degree relatives        Review of Systems:  CONSTITUTIONAL: No fever, weight loss, or fatigue  EYES: No eye pain, visual disturbances, or discharge  ENMT:  No difficulty hearing, tinnitus, vertigo; No sinus or throat pain  NECK: No pain or stiffness  BREASTS: No pain, masses, or nipple discharge  RESPIRATORY: No cough, wheezing, chills or hemoptysis; No shortness of breath  CARDIOVASCULAR: No chest pain, palpitations, dizziness, or leg swelling  GASTROINTESTINAL: No abdominal or epigastric pain. No nausea, vomiting, or hematemesis; No diarrhea or constipation. No melena or hematochezia.  GENITOURINARY: No dysuria, frequency, hematuria, or incontinence  NEUROLOGICAL: No headaches, memory loss, loss of strength, numbness, or tremors  SKIN: No itching, burning, rashes, or lesions   LYMPH NODES: No enlarged glands  ENDOCRINE: No heat or cold intolerance; No hair loss  MUSCULOSKELETAL: No joint pain or swelling; No muscle, back, or extremity pain  PSYCHIATRIC: No depression, anxiety, mood swings, or difficulty sleeping  HEME/LYMPH: No easy bruising, or bleeding gums  ALLERY AND IMMUNOLOGIC: No hives or eczema    SOCIAL HISTORY:    Vital Signs Last 24 Hrs  T(C): 36.2 (13 Feb 2023 06:08), Max: 36.7 (12 Feb 2023 13:39)  T(F): 97.2 (13 Feb 2023 06:08), Max: 98 (12 Feb 2023 13:39)  HR: 98 (13 Feb 2023 06:08) (88 - 107)  BP: 142/65 (13 Feb 2023 06:08) (110/66 - 142/65)  RR: 18 (13 Feb 2023 06:08) (17 - 18)          Physical Exam:  GENERAL: NAD, well-groomed, well-developed  HEAD:  NCAT  EYES: EOMI, PERRLA, conjunctiva clear  ENMT: No tonsillar erythema, exudates, or enlargement; Moist mucous membranes, Good dentition, No lesions  NECK: Supple, No JVD, Normal thyroid  NERVOUS SYSTEM: AAOX4, Good concentration; Motor Strength 5/5 B/L upper and lower extremities; DTRs 2+ intact and symmetric  CHEST/LUNG: CTA b/l no w/r/r  HEART: +s1s2 RRR no m/g/r  ABDOMEN: soft, NT/ND (+) bs, no HSM  EXTREMITIES:  2+ Peripheral Pulses, No c/c/e  LYMPH: No lymphadenopathy noted  SKIN: No rashes or lesions    ECG: LVH with QRS widening, Nonspecific ST and T wave abnormality    ECHO: 2/13/23  1. Left ventricular ejection fraction, by visual estimation, is <20%.  2. Severely decreased global left ventricular systolic function.  3. The left ventricular diastolic function could not be assessed in this study.  4. Left atrial enlargement.  5. Mild-to-moderate aortic regurgitation.  6. Moderate mitral regurgitation.      I&O's Detail      LABS:                        11.6   7.98  )-----------( 198      ( 12 Feb 2023 07:38 )             36.4     02-12    139  |  105  |  10  ----------------------------<  91  4.0   |  25  |  0.8    Ca    8.5      12 Feb 2023 07:38  Mg     2.0     02-12    TPro  5.5<L>  /  Alb  3.4<L>  /  TBili  0.3  /  DBili  x   /  AST  11  /  ALT  11  /  AlkPhos  94  02-12        I&O's Summary    BNP    RADIOLOGY & ADDITIONAL STUDIES:   HPI:  72 F w h/o HTN presents for generalized symptoms of sob, chest pain and fatigue. In ED, there was some concern for ST changes on EKG so code STEMI was call but later cancelled after evaluated by cardiology. Patient with no complaints at time of my exam; ROS neg.     Initial Vitals showed, T 99.6, /77, , RR20, SpO2 97% on RA.   Labs significant for wbc 15.17 and BNP 7194  Later on it was reported that patient spiked a fever to 102 and RVP came back positive for Enterovirus.    Patient was admitted for sepsis workup.  (11 Feb 2023 04:50)    Currently, she denies chest pain, shortness of breath, palpitations and syncope. No leg swelling, orthopnea and PND.     Her cardiologist is Dr. Camarena.       PAST MEDICAL & SURGICAL HISTORY:  Hypertension      No significant past surgical history          MEDICATIONS  (STANDING):  albuterol    90 MICROgram(s) HFA Inhaler 2 Puff(s) Inhalation every 6 hours  albuterol/ipratropium for Nebulization 3 milliLiter(s) Nebulizer every 6 hours  aspirin  chewable 81 milliGRAM(s) Oral daily  enoxaparin Injectable 40 milliGRAM(s) SubCutaneous every 24 hours  lisinopril 10 milliGRAM(s) Oral daily    MEDICATIONS  (PRN):  acetaminophen     Tablet .. 650 milliGRAM(s) Oral every 6 hours PRN Temp greater or equal to 38C (100.4F), Mild Pain (1 - 3), Moderate Pain (4 - 6)  aluminum hydroxide/magnesium hydroxide/simethicone Suspension 30 milliLiter(s) Oral every 4 hours PRN Dyspepsia  guaifenesin/dextromethorphan Oral Liquid 10 milliLiter(s) Oral every 4 hours PRN Cough      FAMILY HISTORY:  No pertinent family history in first degree relatives        Review of Systems:  CONSTITUTIONAL: No fever, weight loss, or fatigue  EYES: No eye pain, visual disturbances, or discharge  ENMT:  No difficulty hearing, tinnitus, vertigo; No sinus or throat pain  NECK: No pain or stiffness  BREASTS: No pain, masses, or nipple discharge  RESPIRATORY: No cough, wheezing, chills or hemoptysis; No shortness of breath  CARDIOVASCULAR: No chest pain, palpitations, dizziness, or leg swelling  GASTROINTESTINAL: No abdominal or epigastric pain. No nausea, vomiting, or hematemesis; No diarrhea or constipation. No melena or hematochezia.  GENITOURINARY: No dysuria, frequency, hematuria, or incontinence  NEUROLOGICAL: No headaches, memory loss, loss of strength, numbness, or tremors  SKIN: No itching, burning, rashes, or lesions   LYMPH NODES: No enlarged glands  ENDOCRINE: No heat or cold intolerance; No hair loss  MUSCULOSKELETAL: No joint pain or swelling; No muscle, back, or extremity pain  PSYCHIATRIC: No depression, anxiety, mood swings, or difficulty sleeping  HEME/LYMPH: No easy bruising, or bleeding gums  ALLERY AND IMMUNOLOGIC: No hives or eczema    SOCIAL HISTORY:    Vital Signs Last 24 Hrs  T(C): 36.2 (13 Feb 2023 06:08), Max: 36.7 (12 Feb 2023 13:39)  T(F): 97.2 (13 Feb 2023 06:08), Max: 98 (12 Feb 2023 13:39)  HR: 98 (13 Feb 2023 06:08) (88 - 107)  BP: 142/65 (13 Feb 2023 06:08) (110/66 - 142/65)  RR: 18 (13 Feb 2023 06:08) (17 - 18)          Physical Exam:  GENERAL: NAD, well-groomed, well-developed  HEAD:  NCAT  EYES: EOMI, PERRLA, conjunctiva clear  ENMT: No tonsillar erythema, exudates, or enlargement; Moist mucous membranes, Good dentition, No lesions  NECK: Supple, No JVD, Normal thyroid  NERVOUS SYSTEM: AAOX4, Good concentration; Motor Strength 5/5 B/L upper and lower extremities; DTRs 2+ intact and symmetric  CHEST/LUNG: CTA b/l no w/r/r  HEART: +s1s2 RRR no m/g/r  ABDOMEN: soft, NT/ND (+) bs, no HSM  EXTREMITIES:  2+ Peripheral Pulses, No c/c/e  LYMPH: No lymphadenopathy noted  SKIN: No rashes or lesions    ECG: LVH with QRS widening, Nonspecific ST and T wave abnormality    ECHO: 2/13/23  1. Left ventricular ejection fraction, by visual estimation, is <20%.  2. Severely decreased global left ventricular systolic function.  3. The left ventricular diastolic function could not be assessed in this study.  4. Left atrial enlargement.  5. Mild-to-moderate aortic regurgitation.  6. Moderate mitral regurgitation.      I&O's Detail      LABS:                        11.6   7.98  )-----------( 198      ( 12 Feb 2023 07:38 )             36.4     02-12    139  |  105  |  10  ----------------------------<  91  4.0   |  25  |  0.8    Ca    8.5      12 Feb 2023 07:38  Mg     2.0     02-12    TPro  5.5<L>  /  Alb  3.4<L>  /  TBili  0.3  /  DBili  x   /  AST  11  /  ALT  11  /  AlkPhos  94  02-12        I&O's Summary    BNP    RADIOLOGY & ADDITIONAL STUDIES:

## 2023-02-13 NOTE — PROGRESS NOTE ADULT - ATTENDING COMMENTS
Medicine Attending Addendum  Patient was seen and examined with medicine team.  Nursing records reviewed. I agree with the resident/PA/NP's note including past medical history, home medications, social history, allergies, surgical history, family history, and review of system. I have reviewed relevant vitals, laboratory values, imaging studies, and microbiology. I have fully participated in the care of this patient. Unless otherwise indicated, all procedures were done or directly supervised by me, the teaching physician.    Clint Turner MD/Jesus  Attending Physician

## 2023-02-13 NOTE — PHYSICAL THERAPY INITIAL EVALUATION ADULT - GENERAL OBSERVATIONS, REHAB EVAL
Pt encountered in sitting at EOB, AxOx4, alert, in NAD, no c/o pain, and agreeable to participate with PT. Pt is indep with bed mobility, indep with sit/stand transfers and ambulated 250 ft indep without AD. Pt neg 9 steps indep without HR with step over pattern. Pt left in sitting at EOB with call yanez in reach, VÍCTOR Bansal aware. Pt D/C to home and f/u with outpatient care. Skilled PT is not reccommended at this level of care. Pt encountered in sitting at EOB, AxOx4, alert, in NAD, no c/o pain, and agreeable to participate with PT. Pt is indep with bed mobility, indep with sit/stand transfers and ambulated 250 ft indep without AD. Pt neg 9 steps indep without HR with step over pattern. Pt left in sitting at EOB with call yanez in reach, RN Nimisha aware. Pt D/C to home and f/u with outpatient care for general conditioning. Skilled PT is not recommended at this level of care 2* no functional impairment found and pt is indep with functional mobility.

## 2023-02-13 NOTE — PHYSICAL THERAPY INITIAL EVALUATION ADULT - PERTINENT HX OF CURRENT PROBLEM, REHAB EVAL
72 F w h/o HTN presents for generalized symptoms of sob, chest pain and fatigue. In ED, there was some concern for ST changes on EKG so code STEMI was call but later cancelled after evaluated by cardiology. Later on patient spiked a fever to 102 and RVP came back positive for Enterovirus.

## 2023-02-14 ENCOUNTER — TRANSCRIPTION ENCOUNTER (OUTPATIENT)
Age: 73
End: 2023-02-14

## 2023-02-14 VITALS
SYSTOLIC BLOOD PRESSURE: 125 MMHG | RESPIRATION RATE: 18 BRPM | TEMPERATURE: 97 F | DIASTOLIC BLOOD PRESSURE: 64 MMHG | HEART RATE: 97 BPM

## 2023-02-14 PROCEDURE — 99233 SBSQ HOSP IP/OBS HIGH 50: CPT

## 2023-02-14 RX ORDER — METOPROLOL TARTRATE 50 MG
25 TABLET ORAL EVERY 6 HOURS
Refills: 0 | Status: DISCONTINUED | OUTPATIENT
Start: 2023-02-14 | End: 2023-02-14

## 2023-02-14 RX ORDER — ATORVASTATIN CALCIUM 80 MG/1
1 TABLET, FILM COATED ORAL
Qty: 30 | Refills: 2
Start: 2023-02-14 | End: 2023-05-14

## 2023-02-14 RX ORDER — METOPROLOL TARTRATE 50 MG
1 TABLET ORAL
Qty: 30 | Refills: 2
Start: 2023-02-14 | End: 2023-05-14

## 2023-02-14 RX ORDER — ASPIRIN/CALCIUM CARB/MAGNESIUM 324 MG
1 TABLET ORAL
Qty: 30 | Refills: 2
Start: 2023-02-14 | End: 2023-05-14

## 2023-02-14 RX ADMIN — Medication 3 MILLILITER(S): at 09:50

## 2023-02-14 RX ADMIN — ENOXAPARIN SODIUM 40 MILLIGRAM(S): 100 INJECTION SUBCUTANEOUS at 16:13

## 2023-02-14 RX ADMIN — Medication 25 MILLIGRAM(S): at 05:24

## 2023-02-14 RX ADMIN — Medication 3 MILLILITER(S): at 16:13

## 2023-02-14 RX ADMIN — Medication 25 MILLIGRAM(S): at 11:49

## 2023-02-14 RX ADMIN — LISINOPRIL 10 MILLIGRAM(S): 2.5 TABLET ORAL at 05:24

## 2023-02-14 RX ADMIN — Medication 81 MILLIGRAM(S): at 11:49

## 2023-02-14 NOTE — DISCHARGE NOTE PROVIDER - NSDCCPCAREPLAN_GEN_ALL_CORE_FT
PRINCIPAL DISCHARGE DIAGNOSIS  Diagnosis: Acute sepsis  Assessment and Plan of Treatment: You were evaluated in the hospital for your chest pain and shortness of breath. You were found to have a fever as well, most likely because of the enterovirus. You were provided supportive treatment with improvement. You had an echo done to assess your heart which showed an ejectrion fraction of <20%. Cardiology saw you and advised some medication changes (See below) and for you to follow up with Dr Esparza. You were otherwise doing well from a cardiac perspective.   After discharge, you will need to:   - Follow up with your primary care doctor within 1-2 weeks  - Follow up with Cardiologist Dr Esparza in 1-2 weeks. Speak with him about potentially swtiching to Entresto and starting an SGLT2 inhibitor.   - START taking Aspirin, Atorvastatin.   - START Taking Metoprolol succinate ER 100mg daily, but if you find that your heart rate is less than 60 beats per minute, skip your dose and speak with your Cardiologist.   - Take all the medications you were discharged with, unless otherwise instructed by your healthcare provider(s).   Please follow up with your providers by calling them to make an appointment so that you can see them in 1-2weeks; bring your paperwork from this hospital stay to that visit. You can access your visit information by signing up for an account for the patient portal at [email or web address]   Seek immediate medical attention if you develop fevers, chills, chest pain, shortness of breath, nausea and vomiting, abdominal pain, passing out, weakness or numbness or tingling on one side of your body, or any other concerning signs or symptoms.      SECONDARY DISCHARGE DIAGNOSES  Diagnosis: Community acquired pneumonia  Assessment and Plan of Treatment:     Diagnosis: Pulmonary congestion  Assessment and Plan of Treatment:

## 2023-02-14 NOTE — DISCHARGE NOTE PROVIDER - CARE PROVIDER_API CALL
Neil Pires (MD)  Internal Medicine  [address] [place] [address]  Phone: [phone]  Fax: [phone]  Follow Up Time:

## 2023-02-14 NOTE — DISCHARGE NOTE PROVIDER - HOSPITAL COURSE
71 y/o woman w PMHx of HTN, ?HFrEF reports prior EF of 30s and follows w Dr Esparza, presents for CP and SOB, in ED initially was code STEMI but cancelled, trop neg <0.01 x2, admitted 2/11/23 for further workup of CP. (+)enterovirus and fever 102.   During admission had echo w EF <20%, no prior echo in records and pt denied h/o heart failure but when told her EF was <20%, stated that it was 30s before. Informed pt she has heart failure.   Evaluated by cardio while admitted, deemed asymptomatic from cardio perspective and w recs for medical optimization, no inpatient need for ischemia eval. Started on metop as inpatient, will d/c w lisinopril and plan to f/u w Dr Esparza switch to entresto as outpatient, along w starting ASA and SGLT2 inhibitor.       #Enterovirus infection with sepsis on admission   - patient febrile, tachycardic and tachypneic in ED w leukocytosis on labs  -ID consult appreciated: viral infection, no indication for antibiotics , D/C  - Supportive care   - f/u BCX  - Recall ID PRN or if BCX results as +   - cont nebs and inhaler, along w pain, fever, cough control prn    #Elevated BNP in setting of viral infection  #h/o ?HFrEF w EF ?30%   -patient has no chest pain currently, highly doubt pericarditis  -Echo w EF <20%     #HTN - cont home lisinopril

## 2023-02-14 NOTE — DISCHARGE NOTE NURSING/CASE MANAGEMENT/SOCIAL WORK - PATIENT PORTAL LINK FT
You can access the FollowMyHealth Patient Portal offered by [facility] by registering at the following website: [email or web address]. By joining [employer]’s FollowMyHealth portal, you will also be able to view your health information using other applications (apps) compatible with our system.

## 2023-02-14 NOTE — DISCHARGE NOTE PROVIDER - NSDCFUSCHEDAPPT_GEN_ALL_CORE_FT
Kalia Esparza  [facility]  CARDIOLOGY [address]  Scheduled Appointment: 02/15/2023    Kalia Esparza  [facility]  CARDIOLOGY [address]  Scheduled Appointment: 04/27/2023

## 2023-02-14 NOTE — DISCHARGE NOTE PROVIDER - NSDCMRMEDTOKEN_GEN_ALL_CORE_FT
aspirin 81 mg oral tablet, chewable: 1 tab(s) orally once a day  atorvastatin 40 mg oral tablet: 1 tab(s) orally once a day (at bedtime)  lisinopril 10 mg oral tablet: 1 tab(s) orally once a day  metoprolol succinate 100 mg oral capsule, extended release: 1 cap(s) orally once a day. Skip your daily dose if your heart rate is lower than 60 beats per minute.

## 2023-02-14 NOTE — PROGRESS NOTE ADULT - ASSESSMENT
71 y/o woman w PMHx of HTN, ?HFrEF reports prior EF of 30s and follows w Dr Esparza, presents for CP and SOB, in ED initially was code STEMI but cancelled, trop neg <0.01 x2, admitted 2/11/23 for further workup of CP. (+)enterovirus and fever 102.   During admission had echo w EF <20%, no prior echo in records and pt denied h/o heart failure but when told her EF was <20%, stated that it was 30s before. Informed pt she has heart failure.     #Enterovirus infection with sepsis on admission   - patient febrile, tachycardic and tachypneic in ED w leukocytosis on labs  -ID consult appreciated: viral infection, no indication for antibiotics , D/C  - Supportive care   - f/u BCX  - Recall ID PRN or if BCX results as +   - cont nebs and inhaler, along w pain, fever, cough control prn    #Elevated BNP in setting of viral infection  #h/o ?HFrEF w EF ?30%   -patient has no chest pain currently, highly doubt pericarditis  -Echo w EF <20%     #HTN - cont home lisinopril                                                                              ----------------------------------------------------  # DVT prophylaxis: Lovenox   # GI prophylaxis: Pantoprazole   # Diet: DASH   # Activity: IAT   # Code status: FULL CODE   # Disposition: Acute, potential ischemic w/u if none in past                                                                            --------------------------------------------------------    # Handoff:   - F/u cardio recs, obtain records from Dr Esparza   
72 F w h/o HTN presents for generalized symptoms of sob, chest pain and fatigue. In ED, there was some concern for ST changes on EKG so code STEMI was call but later cancelled after evaluated by cardiology. Later on patient spiked a fever to 102 and RVP came back positive for Enterovirus.    #Enterovirus infection with sepsis on admission   - patient febrile, tachycardic and tachypneic in ED w leukocytosis on labs  -ID consult appreciated: viral infection, no indication for antibiotics , D/C  - Supportive care   - f/u BCX  - Recall ID PRN or if BCX results as +   - cont nebs and inhaler, along w pain, fever, cough control prn    #Elevated BNP in setting of viral infection  -patient has no chest pain currently, highly doubt pericarditis  - check echo to evaluate EF and for effusion   -if ECHO normal can d/c      #HTN  - cont home lisinopril    DVT ppx: lovenox  GI ppx: ppi  Diet: DASH  Activity: IAT    #Progress Note Handoff  Pending (specify):  ECHO  Family discussion: Plan of care discussed with patient, aware and agreeable   Disposition: Home
71 y/o woman w PMHx of HTN, ?HFrEF reports prior EF of 30s and follows w Dr Esparza, presents for CP and SOB, in ED initially was code STEMI but cancelled, trop neg <0.01 x2, admitted 2/11/23 for further workup of CP. (+)enterovirus and fever 102.   During admission had echo w EF <20%, no prior echo in records and pt denied h/o heart failure but when told her EF was <20%, stated that it was 30s before. Informed pt she has heart failure.     #Enterovirus infection with sepsis on admission   - patient febrile, tachycardic and tachypneic in ED w leukocytosis on labs  -ID consult appreciated: viral infection, no indication for antibiotics ,   - Blood cx ngtd   - cont nebs and inhaler, along w pain, fever, cough control prn    #Elevated BNP in setting of viral infection  #h/o ?HFrEF w EF ?30%   -patient has no chest pain currently, highly doubt pericarditis  -Echo w EF <20%  - f/u Cardio as outpt   - currently on Ace I / BB, add ASA     #HTN - cont home lisinopril    # DVT prophylaxis: Lovenox   # GI prophylaxis: Pantoprazole   # Diet: DASH   # Activity: IAT   # Code status: FULL CODE   # Disposition: Acute, potential ischemic w/u if none in past                                                                            DC planning.

## 2023-02-14 NOTE — DISCHARGE NOTE PROVIDER - NSDCFUADDINST_GEN_ALL_CORE_FT
You were evaluated in the hospital for your chest pain and shortness of breath. You were found to have a fever as well, most likely because of the enterovirus. You were provided supportive treatment with improvement. You had an echo done to assess your heart which showed an ejectrion fraction of <20%. Cardiology saw you and advised some medication changes (See below) and for you to follow up with Dr Esparza. You were otherwise doing well from a cardiac perspective.   After discharge, you will need to:  - Follow up with your primary care doctor within 1-2 weeks - Follow up with Cardiologist Dr Esparza in 1-2 weeks. Speak with him about potentially swtiching to Entresto and starting an SGLT2 inhibitor.  - START taking Aspirin, Atorvastatin.  - START Taking Metoprolol succinate ER 100mg daily, but if you find that your heart rate is less than 60 beats per minute, skip your dose and speak with your Cardiologist.  - Take all the medications you were discharged with, unless otherwise instructed by your healthcare provider(s).   Please follow up with your providers by calling them to make an appointment so that you can see them in 1-2weeks; bring your paperwork from this hospital stay to that visit. You can access your visit information by signing up for an account for the patient portal at [email or web address]   Seek immediate medical attention if you develop fevers, chills, chest pain, shortness of breath, nausea and vomiting, abdominal pain, passing out, weakness or numbness or tingling on one side of your body, or any other concerning signs or symptoms.

## 2023-02-14 NOTE — PROGRESS NOTE ADULT - SUBJECTIVE AND OBJECTIVE BOX
FÁTIMA WALTER  72y  Female      Patient is a 72y old  Female who presents with a chief complaint of sepsis.      INTERVAL HPI/OVERNIGHT EVENTS:      ******************************* REVIEW OF SYSTEMS:**********************************************    All other review of systems negative    *********************** VITALS ******************************************    T(F): 97.5 (02-14-23 @ 04:20)  HR: 88 (02-14-23 @ 04:20) (88 - 101)  BP: 154/67 (02-14-23 @ 04:20) (114/54 - 154/67)  RR: 18 (02-14-23 @ 04:20) (17 - 18)  SpO2: --            ******************************** PHYSICAL EXAM:**************************************************  GENERAL: NAD    PSYCH: no agitation, baseline mentation  HEENT:     NERVOUS SYSTEM:  Alert & Oriented X3,     PULMONARY: BRENNAN, CTA    CARDIOVASCULAR: S1S2 RRR    GI: Soft, NT, ND; BS present.    EXTREMITIES:  2+ Peripheral Pulses, No clubbing, cyanosis, or edema    LYMPH: No lymphadenopathy noted    SKIN: No rashes or lesions      **************************** LABS *******************************************************                  Lactate Trend        CAPILLARY BLOOD GLUCOSE              **************************Active Medications *******************************************  No Known Allergies      acetaminophen     Tablet .. 650 milliGRAM(s) Oral every 6 hours PRN  albuterol    90 MICROgram(s) HFA Inhaler 2 Puff(s) Inhalation every 6 hours  albuterol/ipratropium for Nebulization 3 milliLiter(s) Nebulizer every 6 hours  aluminum hydroxide/magnesium hydroxide/simethicone Suspension 30 milliLiter(s) Oral every 4 hours PRN  aspirin  chewable 81 milliGRAM(s) Oral daily  atorvastatin 40 milliGRAM(s) Oral at bedtime  enoxaparin Injectable 40 milliGRAM(s) SubCutaneous every 24 hours  guaifenesin/dextromethorphan Oral Liquid 10 milliLiter(s) Oral every 4 hours PRN  lisinopril 10 milliGRAM(s) Oral daily  metoprolol tartrate 25 milliGRAM(s) Oral every 6 hours      ***************************************************  RADIOLOGY & ADDITIONAL TESTS:    Imaging Personally Reviewed:  [ ] YES  [ ] NO    HEALTH ISSUES - PROBLEM Dx:

## 2023-02-15 ENCOUNTER — APPOINTMENT (OUTPATIENT)
Dept: CARDIOLOGY | Facility: CLINIC | Age: 73
End: 2023-02-15
Payer: MEDICARE

## 2023-02-15 VITALS
HEART RATE: 78 BPM | BODY MASS INDEX: 20.57 KG/M2 | SYSTOLIC BLOOD PRESSURE: 113 MMHG | DIASTOLIC BLOOD PRESSURE: 69 MMHG | HEIGHT: 58 IN | OXYGEN SATURATION: 99 % | WEIGHT: 98 LBS

## 2023-02-15 PROCEDURE — 99213 OFFICE O/P EST LOW 20 MIN: CPT

## 2023-02-16 LAB
CULTURE RESULTS: SIGNIFICANT CHANGE UP
SPECIMEN SOURCE: SIGNIFICANT CHANGE UP

## 2023-02-16 NOTE — HISTORY OF PRESENT ILLNESS
[FreeTextEntry1] : FÁTIMA WALTER is a 72-year-old female, with a PMHx significant for nonischemic cardiomyopathy, who presents today for cardiac evaluation. Patient initially presented 12/2019. Found to have abnormal EKG and was referred by PCP. She had a subsequent nuclear stress test without evidence of ischemia. Treated with Lisinopril 2.5 mg initially for cardiomyopathy; however, her BP at the time was low and she was unable to tolerate a higher dose of Lisinopril. Over time, the patient has developed worsening HTN. Lisinopril was increased to 10 mg most recently. Patient was hospitalized at [facility] this past week with URI and fever. A STEMI was called due to LBBB; however, cardiology evaluated. A repeat echo revealed EF of 20%. Also had a prior echo here 1 month ago, which revealed an EF of 30% by Field's biplane. Patient has had a known hx of nonischemic cardiomyopathy for the past 4 years. Currently NYHA Class I and clinically euvolemic. QRS has been stable at 130 ms. In the past she has declined AICD. \par \par Patient is accompanied today by her son for the first time. He is unaware of her diagnosis as it was never told to him.

## 2023-02-16 NOTE — DISCUSSION/SUMMARY
[FreeTextEntry1] : CHF: NYHA Class I. LBBB. QRS of 130 ms. Euvolemic on exam. The patient has been stable on medical therapy for over 3 months and is a candidate for ICD therapy for primary prevention. The patient's son states the patient is now agreeable to AICD. Discussed with Dr. Padilla (EP), who will see the patient for evaluation. Continue Lisinopril 10 mg. Continue Metoprolol 100 mg QD. \par Patient was educated in detail on the signs and symptoms of CHF with son present at bedside.\par \par BP today measures 113/69 mmHg. Counselled on low sodium diet and water restriction to 1L per day. \par \par Instructed to follow up in 4 months. \par Plan was discussed with the patient and her son.

## 2023-02-17 ENCOUNTER — APPOINTMENT (OUTPATIENT)
Dept: ELECTROPHYSIOLOGY | Facility: CLINIC | Age: 73
End: 2023-02-17
Payer: MEDICARE

## 2023-02-17 VITALS
WEIGHT: 98 LBS | HEIGHT: 58 IN | RESPIRATION RATE: 16 BRPM | DIASTOLIC BLOOD PRESSURE: 80 MMHG | BODY MASS INDEX: 20.57 KG/M2 | TEMPERATURE: 98 F | HEART RATE: 87 BPM | SYSTOLIC BLOOD PRESSURE: 130 MMHG

## 2023-02-17 DIAGNOSIS — I42.8 OTHER CARDIOMYOPATHIES: ICD-10-CM

## 2023-02-17 DIAGNOSIS — B33.22 VIRAL MYOCARDITIS: ICD-10-CM

## 2023-02-17 DIAGNOSIS — B97.10 UNSPECIFIED ENTEROVIRUS AS THE CAUSE OF DISEASES CLASSIFIED ELSEWHERE: ICD-10-CM

## 2023-02-17 DIAGNOSIS — Z78.9 OTHER SPECIFIED HEALTH STATUS: ICD-10-CM

## 2023-02-17 DIAGNOSIS — A41.89 OTHER SPECIFIED SEPSIS: ICD-10-CM

## 2023-02-17 DIAGNOSIS — Z87.39 PERSONAL HISTORY OF OTHER DISEASES OF THE MUSCULOSKELETAL SYSTEM AND CONNECTIVE TISSUE: ICD-10-CM

## 2023-02-17 DIAGNOSIS — B97.89 OTHER VIRAL AGENTS AS THE CAUSE OF DISEASES CLASSIFIED ELSEWHERE: ICD-10-CM

## 2023-02-17 DIAGNOSIS — Z82.49 FAMILY HISTORY OF ISCHEMIC HEART DISEASE AND OTHER DISEASES OF THE CIRCULATORY SYSTEM: ICD-10-CM

## 2023-02-17 DIAGNOSIS — I11.0 HYPERTENSIVE HEART DISEASE WITH HEART FAILURE: ICD-10-CM

## 2023-02-17 DIAGNOSIS — R06.02 SHORTNESS OF BREATH: ICD-10-CM

## 2023-02-17 DIAGNOSIS — I50.22 CHRONIC SYSTOLIC (CONGESTIVE) HEART FAILURE: ICD-10-CM

## 2023-02-17 DIAGNOSIS — R07.9 CHEST PAIN, UNSPECIFIED: ICD-10-CM

## 2023-02-17 DIAGNOSIS — I44.7 LEFT BUNDLE-BRANCH BLOCK, UNSPECIFIED: ICD-10-CM

## 2023-02-17 PROCEDURE — 99215 OFFICE O/P EST HI 40 MIN: CPT | Mod: 25

## 2023-02-17 PROCEDURE — 93000 ELECTROCARDIOGRAM COMPLETE: CPT

## 2023-02-17 RX ORDER — LISINOPRIL 10 MG/1
10 TABLET ORAL DAILY
Qty: 90 | Refills: 3 | Status: ACTIVE | COMMUNITY

## 2023-03-13 ENCOUNTER — APPOINTMENT (OUTPATIENT)
Dept: PULMONOLOGY | Facility: CLINIC | Age: 73
End: 2023-03-13
Payer: MEDICARE

## 2023-03-13 VITALS
BODY MASS INDEX: 20.78 KG/M2 | OXYGEN SATURATION: 97 % | WEIGHT: 99 LBS | SYSTOLIC BLOOD PRESSURE: 140 MMHG | HEIGHT: 58 IN | HEART RATE: 88 BPM | DIASTOLIC BLOOD PRESSURE: 88 MMHG

## 2023-03-13 DIAGNOSIS — I42.0 DILATED CARDIOMYOPATHY: ICD-10-CM

## 2023-03-13 DIAGNOSIS — R05.3 CHRONIC COUGH: ICD-10-CM

## 2023-03-13 DIAGNOSIS — I50.22 CHRONIC SYSTOLIC (CONGESTIVE) HEART FAILURE: ICD-10-CM

## 2023-03-13 PROCEDURE — 99204 OFFICE O/P NEW MOD 45 MIN: CPT

## 2023-03-13 RX ORDER — FLUTICASONE PROPIONATE 50 UG/1
50 SPRAY, METERED NASAL DAILY
Qty: 30 | Refills: 1 | Status: ACTIVE | COMMUNITY
Start: 2023-03-13 | End: 1900-01-01

## 2023-03-13 RX ORDER — BUDESONIDE AND FORMOTEROL FUMARATE DIHYDRATE 80; 4.5 UG/1; UG/1
80-4.5 AEROSOL RESPIRATORY (INHALATION) TWICE DAILY
Qty: 1 | Refills: 1 | Status: ACTIVE | COMMUNITY
Start: 2023-03-13 | End: 1900-01-01

## 2023-03-13 NOTE — HISTORY OF PRESENT ILLNESS
[TextBox_4] : 73 yo female with PMH of Non Ischemic cardiomyopathy EF ~20 %, intraventricular conduction delay being planned for ICD.\par Patient presents to the office for Chronic unrelenting cough. \par As per the patient, she started having occasional cough since end of 2022 but her cough is worse since recent admission to the hospital in early February this year for Entero/Rhinov viral penumonia. \par Cough is mostly dry with occasional scant whitish sputum, occurs in the morning or the evening, worse when lays flat but no associated Shortness of breath. also reports occasional wheezing. \par Pont nasal drip experienced on occasion but denies persistent nasal congestion.\par she is never smoker.\par she has been on Lisinopril for more than 6 months but the dose was increased recently.\par Denies orthopnea, PND or leg swelling\par denies any allergic trigger\par

## 2023-03-13 NOTE — ASSESSMENT
[FreeTextEntry1] : Impression:\par Chronic Cough (DDs likely post viral, Lisinopril induced, UACS, Asthma)\par Non ischemic cardiomyopathy\par Intraventricular conduction delay\par \par \par \par \par Plan:\par \par Symbicort BID and as needed\par Flonase nasal spray\par Office spirometry poor quality \par Full PFT\par follow up in 1 month\par If symptoms persistent then consider switching lisinopril to ARBs\par aspiration precaution

## 2023-03-13 NOTE — END OF VISIT
[] : Fellow [FreeTextEntry3] : HFREF; planned for AICD and cardiac cath with cardiology \par Recent enterovirus infection; could be cough variant HAAD \par Will start Symbicort BID, albuterol PRN; if fails may consider trial of prednisone\par Check PFTs; CXR reviewed and is normal \par She is also on lisinopril but has been on it for a long time\par If all treatments fail then we should consider switching the lisinopril to an ARB\par Will follow in 1 month

## 2023-03-14 PROBLEM — R06.02 SHORTNESS OF BREATH: Status: ACTIVE | Noted: 2022-08-30

## 2023-03-14 RX ORDER — METOPROLOL SUCCINATE 25 MG/1
25 TABLET, EXTENDED RELEASE ORAL
Refills: 0 | Status: ACTIVE | COMMUNITY

## 2023-03-14 NOTE — END OF VISIT
[FreeTextEntry3] : I, Abiodun Padilla, personally performed the services described in this documentation. All medical record entries made by the scribe/nurse CTA were at my direction and in my presence. I have reviewed the chart and agree that the record reflects my personal performance and is accurate and complete.\par  [Time Spent: ___ minutes] : I have spent [unfilled] minutes of time on the encounter.

## 2023-03-14 NOTE — REASON FOR VISIT
[Cardiac Failure] : cardiac failure [Arrhythmia/ECG Abnorrmalities] : arrhythmia/ECG abnormalities [FreeTextEntry3] : Dr. Neil Pires - Dr. Kalia Esparza [FreeTextEntry1] : Daughter Estefanía

## 2023-03-14 NOTE — HISTORY OF PRESENT ILLNESS
[FreeTextEntry1] : Hypertension, chronic systolic heart Failure NYHA II/III, LBBB, nonischemic cardiomyopathy diagnosed in 12/2019. She had a subsequent nuclear stress test without evidence of ischemia. She was treated with Lisinopril and Metoprolol. A repeat echo revealed EF of 20%. Also had a prior echo 12/23/2022 revealed an EF of 30% by Field's biplane. \par \par Patient exercise capacity is worsened in recent weeks due to SOB. Usually she was able to walk 10 minutes. \par She has no chest pain, no shortness of breath at rest, no orthopnea, no PND. She denies dizziness, lightheadedness and syncope. She has mild-moderate exertional symptoms.\par \par She presents for evaluation.\par \par \par \par

## 2023-03-14 NOTE — CARDIOLOGY SUMMARY
[de-identified] : (02/17/2023): Sinus rhythm at 88 bpm, LBBB-IVCD with  ms, poor R wave progression, diffuse ST-T wave abnormalities, isolated PVCs.  [de-identified] : (12/2019) nuclear stress test: no ischemia - EF 45% [de-identified] : (02/13/2023): 2D echo. LVEF less than 20%, severely decreased LV systolic function. Mild to moderate AI, moderate MR.\par (12/23/2022):2D echo. EF 30%, LA size normal, RA size normal, mild to moderate MR.\par (2019) Echo EF 35%

## 2023-03-14 NOTE — DISCUSSION/SUMMARY
[FreeTextEntry1] : Ms. Karen Oglesby is a pleasant 72 year-old woman with hypertension, chronic systolic heart Failure NYHA II/III, LBBB, nonischemic cardiomyopathy diagnosed in 12/2019 with EF 35%. She had a subsequent nuclear stress test without evidence of ischemia. She was treated with Lisinopril and Metoprolol. A repeat echo 12/23/2022 revealed an EF of 30%, and 2/13/2023 EF 20%. Patient is being treated with Metoprolol and Lisinopril.\par \par I recommend to continue GDMT. HF management and optimization per Dr. Esparza.\par \par Patient has systolic HF NYHA II/III with IVCD-LBBB with  ms and EF 20% despite GDMT. Patient will benefit \par \par I am recommending a biventricular defibrillator implant for the primary prevention of sudden cardiac death. A thorough discussion was held with the patient concerning all aspects of ICD therapy. We reviewed the data supporting ICD therapy and how it applies individually. We discussed the risks, nature of procedure, and follow up care after device is implanted, including outcomes of ICD implantation and living with an ICD. We discussed management of ICD therapy throughout life, including deactivation of the ICD. Patient is in agreement with proceeding with the device implant. We discussed the risks of bleeding, hematoma, injury to vessels and heart, perforation, tamponade, pneumothorax, infection, lead dislodgment, device malfunction, and rare risks of stroke/heart attack/death. Patient expressed understanding of the discussion. After all questions were answered, it was a shared decision to proceed with ICD therapy. My  will contact patient with date and instruction prior to the procedure.\par \par Patient will follow with me in 4-6 weeks' time or earlier if symptoms develop worsen. Please do not hesitate to contact me at [phone] if you have any further questions regarding this patient care. [EKG obtained to assist in diagnosis and management of assessed problem(s)] : EKG obtained to assist in diagnosis and management of assessed problem(s)

## 2023-03-14 NOTE — ADDENDUM
[FreeTextEntry1] : Addie GUEVARA assisted in documentation on 02/17/2023   acting as a scribe for Dr. Abiodun Padilla.\par

## 2023-03-16 VITALS
WEIGHT: 98.11 LBS | DIASTOLIC BLOOD PRESSURE: 63 MMHG | HEIGHT: 58 IN | RESPIRATION RATE: 16 BRPM | SYSTOLIC BLOOD PRESSURE: 153 MMHG | OXYGEN SATURATION: 100 % | HEART RATE: 66 BPM

## 2023-03-16 NOTE — H&P CARDIOLOGY - COMMENTS
71 y/o F with FHx for CAD, PMHx of HTN, HLD, recent admission to the hospital for cough, palpitations, and SOB, CE negative x 2, was febrile, treated for viral pneumonia, was found to have EF 20% on ECHO 2/13/23 from previous of 35%.  Pt is planned for possible AICD, now referred for cardiac cath with possible intervention if clinically indicated.

## 2023-03-16 NOTE — H&P CARDIOLOGY - NSICDXPASTSURGICALHX_GEN_ALL_CORE_FT
PAST SURGICAL HISTORY:  No significant past surgical history      PAST SURGICAL HISTORY:  H/O cataract extraction     History of tonsillectomy

## 2023-03-16 NOTE — H&P CARDIOLOGY - HISTORY OF PRESENT ILLNESS
73 y/o F with PMHx of HTN, HLD, recent admission to the hospital for c/p and SOB, CE negative x 2, was febrile, treated for viral pneumonia, was found to have EF 20% on ECHO 2/13/23 from previous of 35%.  Pt is planned for possible AICD.  Denies any chest pain, dizziness, n/v, diaphoresis, orthopnea, PND, LE edema, palpitations, syncope.  Pt is now referred for cardiac cath with possible intervention if clinically indicated.    Pre cath note:    indication:  [ ] STEMI                [ ] NSTEMI                 [ ] Acute coronary syndrome                     [ ]Unstable Angina   [ ] high risk  [ ] intermediate risk  [ ] low risk                     [x ] Stable Angina     non-invasive testing:  ECHO                        Date: 2/13/23                    result: [ ] high risk  [x ] intermediate risk  [ ] low risk    Anti- Anginal medications:                    [ ] not used                       [ ] used                   [ ] not used but strong indication not to use    Ejection Fraction                   [x ] <29            [ ] 30-39%   [ ] 40-49%     [ ]>50%    CHF                   [ ] active (within last 14 days on meds   [ x] Chronic (on meds but no exacerbation)    COPD                   [ ] mild (on chronic bronchodilators)  [ ] moderate (on chronic steroid therapy)      [ ] severe (indication for home O2 or PACO2 >50)    Other risk factors:                       [ ] Previous MI                     [ ] CVA/ stroke                    [ ] carotid stent/ CEA                    [ ] PVD/PAD- (arterial aneurysm, non-palpable pulses, tortuous vessel with inability to insert catheter, infra-renal dissection, renal or subclavian artery stenosis)                    [ ] diabetic                    [ ] previous CABG                    [ ] Renal Failure       Right Liam Test:    Adjusted Cath Bleeding Risk: 3.3%    Pre-hydration:   71 y/o F with FHx for CAD, PMHx of HTN, HLD, recent admission to the hospital for cough, palpitations, and SOB, CE negative x 2, was febrile, treated for viral pneumonia, was found to have EF 20% on ECHO 2/13/23 from previous of 35%.  Pt is planned for possible AICD.  Denies any chest pain, dizziness, n/v, diaphoresis, orthopnea, PND, LE edema, palpitations, syncope.  Pt is now referred for cardiac cath with possible intervention if clinically indicated.    Pre cath note:    indication:  [ ] STEMI                [ ] NSTEMI                 [ ] Acute coronary syndrome                     [ ]Unstable Angina   [ ] high risk  [ ] intermediate risk  [ ] low risk                     [x ] Stable Angina     non-invasive testing:  ECHO                        Date: 2/13/23                    result: [ ] high risk  [x ] intermediate risk  [ ] low risk    Anti- Anginal medications:                    [ ] not used                       [x ] used                   [ ] not used but strong indication not to use  -on BB  -will give Amlodipine 2.5mg PO x 1    Ejection Fraction                   [x ] <29            [ ] 30-39%   [ ] 40-49%     [ ]>50%    CHF                   [ ] active (within last 14 days on meds   [ x] Chronic (on meds but no exacerbation)    COPD                   [ ] mild (on chronic bronchodilators)  [ ] moderate (on chronic steroid therapy)      [ ] severe (indication for home O2 or PACO2 >50)    Other risk factors:                       [ ] Previous MI                     [ ] CVA/ stroke                    [ ] carotid stent/ CEA                    [ ] PVD/PAD- (arterial aneurysm, non-palpable pulses, tortuous vessel with inability to insert catheter, infra-renal dissection, renal or subclavian artery stenosis)                    [ ] diabetic                    [ ] previous CABG                    [ ] Renal Failure       Right Liam Test: positve    Adjusted Cath Bleeding Risk: 3.3%    Pre-hydration: 250cc NS bolus

## 2023-03-16 NOTE — H&P CARDIOLOGY - NSICDXPASTMEDICALHX_GEN_ALL_CORE_FT
PAST MEDICAL HISTORY:  HFrEF (heart failure with reduced ejection fraction)     HLD (hyperlipidemia)     Hypertension

## 2023-03-16 NOTE — H&P CARDIOLOGY - NSICDXFAMILYHX_GEN_ALL_CORE_FT
FAMILY HISTORY:  No pertinent family history in first degree relatives     FAMILY HISTORY:  Sibling  Still living? Unknown  FH: CABG (coronary artery bypass surgery), [age]: 81-89

## 2023-03-17 ENCOUNTER — OUTPATIENT (OUTPATIENT)
Dept: INPATIENT UNIT | Facility: HOSPITAL | Age: 73
LOS: 1 days | Discharge: HOME | End: 2023-03-17
Payer: MEDICARE

## 2023-03-17 DIAGNOSIS — Z98.49 CATARACT EXTRACTION STATUS, UNSPECIFIED EYE: Chronic | ICD-10-CM

## 2023-03-17 DIAGNOSIS — R06.02 SHORTNESS OF BREATH: ICD-10-CM

## 2023-03-17 DIAGNOSIS — Z90.89 ACQUIRED ABSENCE OF OTHER ORGANS: Chronic | ICD-10-CM

## 2023-03-17 LAB
ANION GAP SERPL CALC-SCNC: 11 MMOL/L — SIGNIFICANT CHANGE UP (ref 7–14)
BUN SERPL-MCNC: 18 MG/DL — SIGNIFICANT CHANGE UP (ref 10–20)
CALCIUM SERPL-MCNC: 9 MG/DL — SIGNIFICANT CHANGE UP (ref 8.4–10.5)
CHLORIDE SERPL-SCNC: 101 MMOL/L — SIGNIFICANT CHANGE UP (ref 98–110)
CO2 SERPL-SCNC: 24 MMOL/L — SIGNIFICANT CHANGE UP (ref 17–32)
CREAT SERPL-MCNC: 1 MG/DL — SIGNIFICANT CHANGE UP (ref 0.7–1.5)
EGFR: 61 ML/MIN/1.73M2 — SIGNIFICANT CHANGE UP
GLUCOSE SERPL-MCNC: 99 MG/DL — SIGNIFICANT CHANGE UP (ref 70–99)
HCT VFR BLD CALC: 42.7 % — SIGNIFICANT CHANGE UP (ref 37–47)
HGB BLD-MCNC: 13.3 G/DL — SIGNIFICANT CHANGE UP (ref 12–16)
MCHC RBC-ENTMCNC: 25.2 PG — LOW (ref 27–31)
MCHC RBC-ENTMCNC: 31.1 G/DL — LOW (ref 32–37)
MCV RBC AUTO: 80.9 FL — LOW (ref 81–99)
NRBC # BLD: 0 /100 WBCS — SIGNIFICANT CHANGE UP (ref 0–0)
PLATELET # BLD AUTO: 197 K/UL — SIGNIFICANT CHANGE UP (ref 130–400)
POTASSIUM SERPL-MCNC: 4 MMOL/L — SIGNIFICANT CHANGE UP (ref 3.5–5)
POTASSIUM SERPL-SCNC: 4 MMOL/L — SIGNIFICANT CHANGE UP (ref 3.5–5)
RBC # BLD: 5.28 M/UL — SIGNIFICANT CHANGE UP (ref 4.2–5.4)
RBC # FLD: 14.6 % — HIGH (ref 11.5–14.5)
SODIUM SERPL-SCNC: 136 MMOL/L — SIGNIFICANT CHANGE UP (ref 135–146)
WBC # BLD: 5.58 K/UL — SIGNIFICANT CHANGE UP (ref 4.8–10.8)
WBC # FLD AUTO: 5.58 K/UL — SIGNIFICANT CHANGE UP (ref 4.8–10.8)

## 2023-03-17 PROCEDURE — 85027 COMPLETE CBC AUTOMATED: CPT

## 2023-03-17 PROCEDURE — C1894: CPT

## 2023-03-17 PROCEDURE — C1769: CPT

## 2023-03-17 PROCEDURE — 80048 BASIC METABOLIC PNL TOTAL CA: CPT

## 2023-03-17 PROCEDURE — C1887: CPT

## 2023-03-17 PROCEDURE — 36415 COLL VENOUS BLD VENIPUNCTURE: CPT

## 2023-03-17 PROCEDURE — 93458 L HRT ARTERY/VENTRICLE ANGIO: CPT

## 2023-03-17 RX ORDER — LISINOPRIL 2.5 MG/1
1 TABLET ORAL
Qty: 0 | Refills: 0 | DISCHARGE

## 2023-03-17 NOTE — CHART NOTE - NSCHARTNOTEFT_GEN_A_CORE
PRE-OP DIAGNOSIS:  new HFrEF    PROCEDURE:   [x ] Coronary Angiogram   [x ] LHC   [ ] LVG   [ ] RHC   [] Intervention (see below)       PHYSICIAN:  Dr. Stephen  FELLOW: Jef    PROCEDURE DESCRIPTION:     Consent:    [x] Patient   [] Family Member   []  Used      Anesthesia:   [ ] General   [X] Sedation   [X] Local     Access & Closure:   [ x]  6 Fr R  Radial Artery  -> D-stat      IV Contrast: 50 mL      Intervention: none    Implants: none    FINDINGS:   Coronary Dominance: right  LM: mild disease  LAD: mild disease  CX: mild disease  RCA: mild disease    LVEDP:  12 mmHg     EF:  40%      ESTIMATED BLOOD LOSS: < 10 mL      CONDITION:   [x] Good   [ ] Fair   [ ] Critical     SPECIMEN REMOVED: N/A     POST-OP DIAGNOSIS:    [ x] Mild Coronary Artery Disease (< 50% stenosis)      PLAN OF CARE:   [ x] D/C Home Today   [ ] Return to In-patient bed   [ ] Admit for observation   [ ] Return for Staged Procedure in 4-6 weeks   [ ] CT Surgery Consult   [X] Medications: ASA,  statin  [X] IV Fluids: NS @ 100cc/h x 3 hours  [ ] EP Consult  [x] Remove D-stat  and Hold manual pressure if signs of hematoma or bleeding over radial access site.  [x] Smoking cessation  [x] MUGA scan

## 2023-03-17 NOTE — ASU PATIENT PROFILE, ADULT - FALL HARM RISK - UNIVERSAL INTERVENTIONS
Bed in lowest position, wheels locked, appropriate side rails in place/Call bell, personal items and telephone in reach/Instruct patient to call for assistance before getting out of bed or chair/Non-slip footwear when patient is out of bed/[place] to call system/Physically safe environment - no spills, clutter or unnecessary equipment/Purposeful Proactive Rounding/Room/bathroom lighting operational, light cord in reach

## 2023-03-20 DIAGNOSIS — I25.10 ATHEROSCLEROTIC HEART DISEASE OF NATIVE CORONARY ARTERY WITHOUT ANGINA PECTORIS: ICD-10-CM

## 2023-03-20 DIAGNOSIS — R94.39 ABNORMAL RESULT OF OTHER CARDIOVASCULAR FUNCTION STUDY: ICD-10-CM

## 2023-03-30 ENCOUNTER — APPOINTMENT (OUTPATIENT)
Dept: ELECTROPHYSIOLOGY | Facility: HOSPITAL | Age: 73
End: 2023-03-30

## 2023-04-17 ENCOUNTER — APPOINTMENT (OUTPATIENT)
Dept: ELECTROPHYSIOLOGY | Facility: CLINIC | Age: 73
End: 2023-04-17

## 2023-04-17 ENCOUNTER — APPOINTMENT (OUTPATIENT)
Dept: PULMONOLOGY | Facility: CLINIC | Age: 73
End: 2023-04-17

## 2023-04-27 ENCOUNTER — APPOINTMENT (OUTPATIENT)
Dept: CARDIOLOGY | Facility: CLINIC | Age: 73
End: 2023-04-27

## 2023-06-14 ENCOUNTER — APPOINTMENT (OUTPATIENT)
Dept: CARDIOLOGY | Facility: CLINIC | Age: 73
End: 2023-06-14